# Patient Record
Sex: MALE | Race: WHITE | NOT HISPANIC OR LATINO | Employment: FULL TIME | URBAN - METROPOLITAN AREA
[De-identification: names, ages, dates, MRNs, and addresses within clinical notes are randomized per-mention and may not be internally consistent; named-entity substitution may affect disease eponyms.]

---

## 2023-04-24 ENCOUNTER — OFFICE VISIT (OUTPATIENT)
Age: 58
End: 2023-04-24

## 2023-04-24 DIAGNOSIS — S39.012D BACK STRAIN, SUBSEQUENT ENCOUNTER: Primary | ICD-10-CM

## 2023-04-24 DIAGNOSIS — M54.41 ACUTE RIGHT-SIDED LOW BACK PAIN WITH RIGHT-SIDED SCIATICA: ICD-10-CM

## 2023-04-24 NOTE — PROGRESS NOTES
"Daily Note     Today's date: 2023  Patient name: Jn Michelle  : 1965  MRN: [id number]  Referring provider: Shila Little DO  Dx:   Encounter Diagnosis     ICD-10-CM    1  Back strain, subsequent encounter  S39 012D       2  Acute right-sided low back pain with right-sided sciatica  M54 41           Start Time: 1525  Stop Time: 1610  Total time in clinic (min): 45 minutes    Subjective: Pt states current pain is 4/10, @ best 4/10, @ worst 5/10  Objective: See treatment diary below      Assessment:   Patient performed Nustep aerobic exercise to increase blood flow to the area being treated, prepare the muscles for strength training and stretching, improve overall tolerance to activity, and aerobic endurance  Initiated close chain LE strengthening and progressed with lumbar stabilization ex with standing tbands  Plan to continue pre-existing stretching program as HEP   Plan to progress wth spinal stabilization and LE strengthening program in order to increase activity level with minimal pain  Plan: Progress treatment as tolerated  Precautions: 1 year history of LBP      Manuals            Rot stretch/STM NV -                                                  Neuro Re-Ed             Instruction in HEP Reviewed previous HEP            Instruction in posture and body mechanics NV discussed hip hinging, maintaining LS spine in neutral while forward flexing at hips             DLS tbands  B shoulder/elbow F/E 20x all           Lateral press  20x/20x GTB L/R           Hip hinge  5x hold 5\"                                     Ther Ex             K to C  LTR  PPT 5x L/R  5x  5x HEP           S/L clamshells 20x L/R GTB 20x L/R GTB           Stand lumbar ext 3x 5x hold 5\"           Prone on elbows 1 min 3 min           Bird dogs 10x 10x           Seated LAQ with DF pumps/nerve tensions - 3x 10x reps L/R           Press ups 5x 5x                        Ther Activity             NUSTEP " 5' seat 10 L5           Stand HT raises  10x/10x           squats  10x           Hip 3 ways  10x L/R                        Modalities

## 2023-04-27 ENCOUNTER — OFFICE VISIT (OUTPATIENT)
Age: 58
End: 2023-04-27

## 2023-04-27 DIAGNOSIS — M54.41 ACUTE RIGHT-SIDED LOW BACK PAIN WITH RIGHT-SIDED SCIATICA: Primary | ICD-10-CM

## 2023-04-27 DIAGNOSIS — S39.012D BACK STRAIN, SUBSEQUENT ENCOUNTER: ICD-10-CM

## 2023-04-27 NOTE — PROGRESS NOTES
"Daily Note     Today's date: 2023  Patient name: Blaine Garrett  : 1965  MRN: [id number]  Referring provider: Delroy Lewis DO  Dx:   Encounter Diagnosis     ICD-10-CM    1  Acute right-sided low back pain with right-sided sciatica  M54 41       2  Back strain, subsequent encounter  S39 012D           Start Time: 1700  Stop Time: 1735  Total time in clinic (min): 35 minutes    Subjective: Pt states painful today having to use back at work today, 5/10  Objective: See treatment diary below      Assessment:  Patient performed Nustep aerobic exercise to increase blood flow to the area being treated, prepare the muscles for strength training and stretching, improve overall tolerance to activity, and aerobic endurance  Progressed with strengthening/lumbar stabilization ex, tolerating all with minimal pain  Continue PT with goal of increasing activity level with minimal pain  Plan: Progress treatment as tolerated  Precautions: 1 year history of LBP      Manuals            NV -                                                  Neuro Re-Ed             Instruction in HEP Reviewed previous HEP            Instruction in posture and body mechanics NV discussed hip hinging, maintaining LS spine in neutral while forward flexing at hips             DLS tbands  B shoulder/elbow F/E 20x all 20x GTB          Lateral press  20x/20x GTB L/R 20x/20x GTB          Hip hinge  5x hold 5\" 5x 5\"          Bridge with abd bracing   10x          DLS lift knee unsupproted   5x L/R          Ther Ex             K to C  LTR  PPT 5x L/R  5x  5x HEP 5x  5x  5x          S/L clamshells 20x L/R GTB 20x L/R GTB 20x L/R          Stand lumbar ext 3x 5x hold 5\" 5x          Prone on elbows 1 min 3 min 3 min          Bird dogs 10x 10x 10x          Seated LAQ with DF pumps/nerve tensions - 3x 10x reps L/R 3 x 10 reps L/R          Press ups 5x 5x 10x                       Ther Activity             NUSTEP  5' seat 10 L5 " 5' L5          Stand HT raises  10x/10x 10x/10x          squats  10x 10x          Hip 3 ways  10x L/R 10x/10x                       Modalities

## 2023-05-01 ENCOUNTER — OFFICE VISIT (OUTPATIENT)
Age: 58
End: 2023-05-01

## 2023-05-01 DIAGNOSIS — S39.012D BACK STRAIN, SUBSEQUENT ENCOUNTER: ICD-10-CM

## 2023-05-01 DIAGNOSIS — M54.41 ACUTE RIGHT-SIDED LOW BACK PAIN WITH RIGHT-SIDED SCIATICA: Primary | ICD-10-CM

## 2023-05-01 NOTE — PROGRESS NOTES
"Daily Note     Today's date: 2023  Patient name: Shilo Medley  : 1965  MRN: [id number]  Referring provider: Marcy Marcano DO  Dx:   Encounter Diagnosis     ICD-10-CM    1  Acute right-sided low back pain with right-sided sciatica  M54 41       2  Back strain, subsequent encounter  S39 012D           Start Time: 911  Stop Time: 1635  Total time in clinic (min): 40 minutes    Subjective: Pt states he is tired after work today, pain 5/10 currently, @ best 4/15, @ worst 10  Objective: See treatment diary below      Assessment:   Patient performed Nustep aerobic exercise to increase blood flow to the area being treated, prepare the muscles for strength training and stretching, improve overall tolerance to activity, and aerobic endurance  Plan: Progress treatment as tolerated  Precautions: 1 year history of LBP      Manuals           NV -                                                  Neuro Re-Ed             Instruction in HEP Reviewed previous HEP            Instruction in posture and body mechanics NV discussed hip hinging, maintaining LS spine in neutral while forward flexing at hips                          DLS tbands  B shoulder/elbow F/E 20x all 20x GTB 20x GTB         Lateral press  20x/20x GTB L/R 20x/20x GTB 20x/20x GTB         Hip hinge  5x hold 5\" 5x 5\" 5x 5\"         Cat n camel    5x         Bridge with abd bracing   10x 20x GTB         DLS lift knee unsupproted   5x L/R 5x L/R         DLS lunge    5x L/R         Ther Ex             K to C  LTR  PPT 5x L/R  5x  5x HEP 5x  5x  5x 5x L/R  10x  10x         S/L clamshells 20x L/R GTB 20x L/R GTB 20x L/R 20x GTB         Stand lumbar ext 3x 5x hold 5\" 5x 5x         Prone on elbows 1 min 3 min 3 min 3 min         Bird dogs 10x 10x 10x 10x         Seated LAQ with DF pumps/nerve tensions - 3x 10x reps L/R 3 x 10 reps L/R 2 x 10 reps L/R         Press ups 5x 5x 10x 10x                      Ther Activity           " NUSTEP  5' seat 10 L5 5' L5 6' L5         Stand HT raises  10x/10x 10x/10x 10x/10x         squats  10x 10x 10x         Hip 3 ways  10x L/R 10x/10x 10x/10x                      Modalities

## 2023-05-03 ENCOUNTER — APPOINTMENT (OUTPATIENT)
Age: 58
End: 2023-05-03
Payer: COMMERCIAL

## 2023-05-03 NOTE — PROGRESS NOTES
"Daily Note     Today's date: 2023  Patient name: Sarita Nicolas  : 1965  MRN: [id number]  Referring provider: Uriel Ramírez DO  Dx:   Encounter Diagnosis     ICD-10-CM    1  Back strain, subsequent encounter  S39 012D       2  Acute right-sided low back pain with right-sided sciatica  M54 41           Start Time: 1615  Stop Time: 1655  Total time in clinic (min): 40 minutes    Subjective: Pain 4-5/10 today  States he is [place] a rough day  \"      Objective: See treatment diary below      Assessment:   Patient performed Nustep aerobic exercise to increase blood flow to the area being treated, prepare the muscles for strength training and stretching, improve overall tolerance to activity, and aerobic endurance  Progressing with LE strengthening, lumbar flexibility and dynamic lumbar stabilization program   Pt completes all activity without complaint  Continue PT with goal of increasing activity level without pain  Plan: Progress treatment as tolerated  Precautions: 1 year history of LBP      Manuals          NV -                                                  Neuro Re-Ed             Instruction in HEP Reviewed previous HEP            Instruction in posture and body mechanics NV discussed hip hinging, maintaining LS spine in neutral while forward flexing at hips                          DLS tbands  B shoulder/elbow F/E 20x all 20x GTB 20x GTB 20x GTB        Lateral press  20x/20x GTB L/R 20x/20x GTB 20x/20x GTB 20x/20x GTB        Hip hinge  5x hold 5\" 5x 5\" 5x 5\" 5x 5'        Cat n camel    5x 10x        Bridge with abd bracing   10x 20x GTB 20x GTB        DLS lift knee unsupproted   5x L/R 5x L/R 10x L/R        DLS lunge    5x L/R 5x L/R        Ther Ex             K to C  LTR  PPT 5x L/R  5x  5x HEP 5x  5x  5x 5x L/R  10x  10x 5x  10x  10x        S/L clamshells 20x L/R GTB 20x L/R GTB 20x L/R 20x GTB 20x GTB        Stand lumbar ext 3x 5x hold 5\" 5x 5x 5x        Prone " on elbows 1 min 3 min 3 min 3 min 3mn        Bird dogs 10x 10x 10x 10x 10x        Seated LAQ with DF pumps/nerve tensions - 3x 10x reps L/R 3 x 10 reps L/R 2 x 10 reps L/R 2 x 10        Press ups 5x 5x 10x 10x 10x                     Ther Activity             NUSTEP  5' seat 10 L5 5' L5 6' L5 7' L5        Stand HT raises  10x/10x 10x/10x 10x/10x 10x/10x        squats  10x 10x 10x 10x        Hip 3 ways  10x L/R 10x/10x 10x/10x 10x/10x L/R        Sit to stand     5x no hands        Modalities

## 2023-05-04 ENCOUNTER — OFFICE VISIT (OUTPATIENT)
Age: 58
End: 2023-05-04

## 2023-05-04 DIAGNOSIS — S39.012D BACK STRAIN, SUBSEQUENT ENCOUNTER: Primary | ICD-10-CM

## 2023-05-04 DIAGNOSIS — M54.41 ACUTE RIGHT-SIDED LOW BACK PAIN WITH RIGHT-SIDED SCIATICA: ICD-10-CM

## 2023-05-04 NOTE — PROGRESS NOTES
"Daily Note     Today's date: 2023  Patient name: Julien Richard  : 1965  MRN: [id number]  Referring provider: Kevin Alvarez DO  Dx:   Encounter Diagnosis     ICD-10-CM    1  Acute right-sided low back pain with right-sided sciatica  M54 41       2  Back strain, subsequent encounter  S39 012D           Start Time: 3795  Stop Time: 1640  Total time in clinic (min): 45 minutes    Subjective: Current pain 3/10, @ worst 4/10, @ best 3/10  Objective: See treatment diary below      Assessment:   Patient performed Nustep aerobic exercise to increase blood flow to the area being treated, prepare the muscles for strength training and stretching, improve overall tolerance to activity, and aerobic endurance  Progressing with LE strengthening and lumbar stabilization ex  Some difficulty with sit to stand without hands from low chair, was able to complete without R LS pain from raised chair  Continue PT with goal of increasing activity level with minimal pain  Plan: Progress treatment as tolerated  Precautions: 1 year history of LBP      Manuals         NV -                                                  Neuro Re-Ed             Instruction in HEP Reviewed previous HEP            Instruction in posture and body mechanics NV discussed hip hinging, maintaining LS spine in neutral while forward flexing at hips                          DLS tbands  B shoulder/elbow F/E 20x all 20x GTB 20x GTB 20x GTB 20x GTB       Lateral press  20x/20x GTB L/R 20x/20x GTB 20x/20x GTB 20x/20x GTB 20x/20x GTB       Hip hinge  5x hold 5\" 5x 5\" 5x 5\" 5x 5' 5x 10\"       Cat n camel    5x 10x 10x       Bridge with abd bracing   10x 20x GTB 20x GTB 20x GTB       DLS lift knee unsupproted   5x L/R 5x L/R 10x L/R 10x L/R       DLS lunge    5x L/R 5x L/R 5x L/R       Ther Ex             K to C  LTR  PPT 5x L/R  5x  5x HEP 5x  5x  5x 5x L/R  10x  10x 5x  10x  10x 5x  10x  10x       S/L clamshells 20x " "L/R GTB 20x L/R GTB 20x L/R 20x GTB 20x GTB 20x GTB       Stand lumbar ext 3x 5x hold 5\" 5x 5x 5x 5x       Prone on elbows 1 min 3 min 3 min 3 min 3mn 3 min       Bird dogs 10x 10x 10x 10x 10x 10x       Seated LAQ with DF pumps/nerve tensions - 3x 10x reps L/R 3 x 10 reps L/R 2 x 10 reps L/R 2 x 10 2 x 10 reps       Press ups 5x 5x 10x 10x 10x 10x                    Ther Activity             NUSTEP  5' seat 10 L5 5' L5 6' L5 7' L5 8'       Stand HT raises  10x/10x 10x/10x 10x/10x 10x/10x 10x/10x       squats  10x 10x 10x 10x 10x       Hip 3 ways  10x L/R 10x/10x 10x/10x 10x/10x L/R 10x L/R       Sit to stand     5x no hands 5x form lowered plinthe 24\"       Modalities                                            "

## 2023-05-08 ENCOUNTER — OFFICE VISIT (OUTPATIENT)
Age: 58
End: 2023-05-08

## 2023-05-08 DIAGNOSIS — S39.012D BACK STRAIN, SUBSEQUENT ENCOUNTER: ICD-10-CM

## 2023-05-08 DIAGNOSIS — M54.41 ACUTE RIGHT-SIDED LOW BACK PAIN WITH RIGHT-SIDED SCIATICA: Primary | ICD-10-CM

## 2023-05-10 NOTE — PROGRESS NOTES
"Daily Note     Today's date: 2023  Patient name: Kayla Garcia  : 1965  MRN: [id number]  Referring provider: Tsering Pierce DO  Dx:   Encounter Diagnosis     ICD-10-CM    1  Back strain, subsequent encounter  S39 012D       2  Acute right-sided low back pain with right-sided sciatica  M54 41           Start Time: 1625  Stop Time: 1705  Total time in clinic (min): 40 minutes    Subjective: More painful today, 5/10  Objective: See treatment diary below      Assessment:   Patient performed Nustep aerobic exercise to increase blood flow to the area being treated, prepare the muscles for strength training and stretching, improve overall tolerance to activity, and aerobic endurance  Pt reporting R LS pain with DLS lunge on R today  Limited excursion of exercise  Continue with lumbar flexibility, B LE strengthening and spinal stabilization program   t is challenged with current ex program   Continue PT with goal of increasing activity level with minimal pin  Plan: Progress treatment as tolerated  Precautions: 1 year history of LBP      Manuals        NV -                                                  Neuro Re-Ed             Instruction in HEP Reviewed previous HEP            Instruction in posture and body mechanics NV discussed hip hinging, maintaining LS spine in neutral while forward flexing at hips                          DLS tbands  B shoulder/elbow F/E 20x all 20x GTB 20x GTB 20x GTB 20x GTB 20x GTB      Lateral press  20x/20x GTB L/R 20x/20x GTB 20x/20x GTB 20x/20x GTB 20x/20x GTB 20x/20x GTB      Hip hinge  5x hold 5\" 5x 5\" 5x 5\" 5x 5' 5x 10\" 5 x 10\"      Cat n camel    5x 10x 10x 10x      Bridge with abd bracing   10x 20x GTB 20x GTB 20x GTB 20x       DLS lift knee unsupproted   5x L/R 5x L/R 10x L/R 10x L/R -      DLS lunge    5x L/R 5x L/R 5x L/R 5x L/R      Ther Ex             K to C  LTR  PPT 5x L/R  5x  5x HEP 5x  5x  5x 5x L/R  10x  10x " "5x  10x  10x 5x  10x  10x 5x  10x  10x      S/L clamshells 20x L/R GTB 20x L/R GTB 20x L/R 20x GTB 20x GTB 20x GTB 20x GTB      Stand lumbar ext 3x 5x hold 5\" 5x 5x 5x 5x 5x      Prone on elbows 1 min 3 min 3 min 3 min 3mn 3 min 3 min      Bird dogs 10x 10x 10x 10x 10x 10x 10x      Seated LAQ with DF pumps/nerve tensions - 3x 10x reps L/R 3 x 10 reps L/R 2 x 10 reps L/R 2 x 10 2 x 10 reps 2 x 10 reps DF      Press ups 5x 5x 10x 10x 10x 10x 10x                   Ther Activity             NUSTEP  5' seat 10 L5 5' L5 6' L5 7' L5 8' 8'      Stand HT raises  10x/10x 10x/10x 10x/10x 10x/10x 10x/10x 10/10x      squats  10x 10x 10x 10x 10x 10x      Hip 3 ways  10x L/R 10x/10x 10x/10x 10x/10x L/R 10x L/R 10x/10x      Sit to stand     5x no hands 5x form lowered plinthe 24\" 10x from 24\" plinthe      Modalities                                            "

## 2023-05-11 ENCOUNTER — OFFICE VISIT (OUTPATIENT)
Age: 58
End: 2023-05-11

## 2023-05-11 DIAGNOSIS — M54.41 ACUTE RIGHT-SIDED LOW BACK PAIN WITH RIGHT-SIDED SCIATICA: ICD-10-CM

## 2023-05-11 DIAGNOSIS — S39.012D BACK STRAIN, SUBSEQUENT ENCOUNTER: Primary | ICD-10-CM

## 2023-05-15 ENCOUNTER — OFFICE VISIT (OUTPATIENT)
Age: 58
End: 2023-05-15

## 2023-05-15 DIAGNOSIS — S39.012D BACK STRAIN, SUBSEQUENT ENCOUNTER: ICD-10-CM

## 2023-05-15 DIAGNOSIS — M54.41 ACUTE RIGHT-SIDED LOW BACK PAIN WITH RIGHT-SIDED SCIATICA: Primary | ICD-10-CM

## 2023-05-15 NOTE — LETTER
May 16, 2023    Ashok Carter DO  [address]   [address]  [address] [facility]    Patient: Gracie Kovacs   YOB: 1965   Date of Visit: 5/15/2023     Encounter Diagnosis     ICD-10-CM    1  Acute right-sided low back pain with right-sided sciatica  M54 41       2  Back strain, subsequent encounter  S39 012D           Dear Dr Zan Hearn: Thank you for your recent referral of Gracie Kovacs  Please review the attached evaluation summary from Amauri's recent visit  Please verify that you agree with the plan of care by signing the attached order  If you have any questions or concerns, please do not hesitate to call  I sincerely appreciate the opportunity to share in the care of one of your patients and hope to have another opportunity to work with you in the near future  Sincerely,    Carlos Mason, PT      Referring Provider:      I certify that I have read the below Plan of Care and certify the need for these services furnished under this plan of treatment while under my care  Ashok Carter   [address]   [address]  [address] [facility]  Via Fax: [phone]          PT Re-Evaluation     Today's date: 5/15/2023  Patient name: Gracie Kovacs  : 1965  MRN: [id number]  Referring provider: Katharine Martinez DO  Dx:   Encounter Diagnosis     ICD-10-CM    1  Acute right-sided low back pain with right-sided sciatica  M54 41       2  Back strain, subsequent encounter  S39 012D           Start Time: 1600  Stop Time: 5474  Total time in clinic (min): 50 minutes    Assessment  Assessment details: The patient is a 59year old man 1 year post onset of this bout of LBP  Pain range 3-5/10  Pt has radiating pain R LE to posterior knee  Pt noting minimal improvement  Modest improvement with ROM and strength noted, however Oswestry improved from 32% disability to 16% disability    Pt working full time without restriction, but states pain increases by the end of the day, notes he is slow and stiff in the morning  Pain with ascending and descending steps, goes 1 at a time, non reciproacally  Pain R LS/hip persists  Plan to continue PT 4-6 weeks with goal of increasing activity level with minimal pain  Impairments: abnormal or restricted ROM, activity intolerance, impaired physical strength, lacks appropriate home exercise program, pain with function and poor body mechanics  Functional limitations: standing all day with pain, walking 15 min, 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; steps non reciprocally  Symptom irritability: moderateUnderstanding of Dx/Px/POC: fair   Prognosis: fair  Prognosis details: 1 year post onset of pain, has had previous course of PT    Goals  STG- 4 weeks 5/15/23  1  I HEP (MET)  2  Instruct patient in proper posture and body mechanics(MET)  3  Improve patients self awareness of posture and body mechanics (progressing)  4  Centralize R LE symptoms (NOT MET)  5  Decrease pain range to 0-5/10 (NOT MET)    STG- 4 weeks 6/12/23-   1  Centralize R LE symptoms  2  Decrease pain range to 2-4/10  3  Up/down steps reciprocally  4  Walk 20-30 minutes consecutively        LTG- 12 weeks 7/10/23  1  Decrease pain range to 0-2  2  Resolve radiating radicular symptoms  3  Improve B hip strength to 4+-5/5  4  Improve trunk ROM to WNL  5   Improve Oswestry score 15-20 points from IE    Plan  Patient would benefit from: PT eval and skilled physical therapy  Referral necessary: No  Planned modality interventions: cryotherapy and thermotherapy: hydrocollator packs  Planned therapy interventions: manual therapy, joint mobilization, neuromuscular re-education, postural training, patient education, therapeutic activities, therapeutic exercise and home exercise program  Frequency: 2x week  Duration in visits: 8  Duration in weeks: 16  Plan of Care beginning date: 5/15/2023  Plan of Care expiration date: 7/10/2023  Treatment plan discussed with: patient        Subjective Evaluation    History of Present Illness  Date of onset: 3/22/2023  Mechanism of injury: 5/15/23- Pt states he is less painful  Able to do small amounts of gardening  Pt able to walk 15 min  States radiating LE pain is less intense  Pain with up and down steps  Pt states he received a chiropractic treatment yesterday  Pt was at work 1 year ago and was lifting a box and twisted and felt a pop  Pt works at [employer]  Pt seen in [place] [id number]  Pt received lidocaine patch and muscle relaxant at that time  Pt received PT for 3 weeks with some pain relief  Pt currently working without restriction  Pt has had another round of PT for 6 months, and was DC from that facility  Pt received epidural injection 2 weeks ago and had 3 days of pain relief  Pt currently in litigation against [employer] for a [employer] claim  Pt referred for outpatient PT  Imaging revealed Lumbar disc bulge L 1-2, L 2-3, and HNPL 3-4, L5-S1, R S1 radiculopathy per EMG            Recurrent probem    Quality of life: fair    Pain  Current pain ratin  At best pain rating: 3  At worst pain ratin  Location: pain radiating from R LS, and radiating to posterior R knee  Quality: burning, throbbing and sharp  Relieving factors: ice and rest  Progression: no change    Social Support  Steps to enter house: yes  3  Stairs in house: yes   13  Lives in: multiple-level home  Lives with: spouse    Employment status: working  Hand dominance: right      Diagnostic Tests  X-ray: abnormal  MRI studies: abnormal  Treatments  Previous treatment: physical therapy  Current treatment: physical therapy  Patient Goals  Patient goals for therapy: decreased pain  Patient goal: decreased pain        Objective     Concurrent Complaints  Negative for night pain, disturbed sleep, bladder dysfunction, bowel dysfunction, saddle (S4) numbness, cardiac problem, kidney problem, gallbladder problem, stomach problem, ulcer, appendix "problem, spleen problem, pancreas problem, history of cancer, history of trauma and infection    Postural Observations  Seated posture: fair  Standing posture: fair    Additional Postural Observation Details  Decreased lumbar lordosis, forward head and shoulders    Tenderness     Lumbar Spine  Tenderness in the spinous process  Right Hip   Tenderness in the PSIS  Additional Tenderness Details  R PSIS and lower lumbar paraspinals very painful    Neurological Testing     Sensation     Lumbar   Left   Intact: light touch    Right   Intact: light touch  Paresthesia: light touch    Additional Neurological Details  Radiating pain from R buttock to posterior R knee    Active Range of Motion     Lumbar   Flexion:  Restriction level: moderate  Extension:  Restriction level: minimal  Left lateral flexion:  Restriction level: minimal  Right lateral flexion:  Restriction level: minimal  Left rotation:  WFL  Right rotation:  Restriction level: minimal    Additional Active Range of Motion Details  Fingertip to floor: For Flx/40cm, SB L/57cm, SB/R/54cm    5/15/23- fingertip to floor: For Flx/ 38cm   SB L/54cm, SB R/53 5cm,   Mechanical Assessment    Cervical      Thoracic    Lying extension:   Pain location: centralized  Pain intensity: better  Pain level: decreased    Lumbar    Standing extension: repeated movements  Pain location: no change    Strength/Myotome Testing     Additional Strength Details  Trunk flx 3/5, ext 4/5  5/15- trunk flx 4/5, ext 4/5    Tests     Lumbar     Left   Negative passive SLR  Right   Negative passive SLR       Additional Tests Details  Pain decreased with B zahra traction    4/17/23- TUG/18\", 5x STS/35\"    5/15/23- TUG/15\", 5x STS/33\"    General Comments:      Lumbar Comments  4/17/23- Oswestry 32%    5/15/23- Modified Oswestry 16%    Hip Comments   R hip IR ROM moderately limited    Knee Comments  B squat 50%, unable to rise to stand without use of hands from 18\" cahir, no hands from 24\" " "chair    Ankle/Foot Comments   HT walk intact            Precautions: 1 year history of LBP      Manuals 4/17 4/24 4/27 5/1 5/4 5/8 5/11 5/15      NV -                                                  Neuro Re-Ed        RE/Oswestry     Instruction in HEP Reviewed previous HEP            Instruction in posture and body mechanics NV discussed hip hinging, maintaining LS spine in neutral while forward flexing at hips                          DLS tbands  B shoulder/elbow F/E 20x all 20x GTB 20x GTB 20x GTB 20x GTB 20x GTB 20x GTB     Lateral press  20x/20x GTB L/R 20x/20x GTB 20x/20x GTB 20x/20x GTB 20x/20x GTB 20x/20x GTB 20x20x GTB     Hip hinge  5x hold 5\" 5x 5\" 5x 5\" 5x 5' 5x 10\" 5 x 10\" 5x 10\"     Cat n camel    5x 10x 10x 10x      Bridge with abd bracing   10x 20x GTB 20x GTB 20x GTB 20x  20x     DLS lift knee unsupproted   5x L/R 5x L/R 10x L/R 10x L/R - 10x L/R     DLS lunge    5x L/R 5x L/R 5x L/R 5x L/R -     Ther Ex             K to C  LTR  PPT 5x L/R  5x  5x HEP 5x  5x  5x 5x L/R  10x  10x 5x  10x  10x 5x  10x  10x 5x  10x  10x 5x  10x  10x     S/L clamshells 20x L/R GTB 20x L/R GTB 20x L/R 20x GTB 20x GTB 20x GTB 20x GTB 20x GTB     Stand lumbar ext 3x 5x hold 5\" 5x 5x 5x 5x 5x 5x     Prone on elbows 1 min 3 min 3 min 3 min 3mn 3 min 3 min 3 min     Bird dogs 10x 10x 10x 10x 10x 10x 10x 10x     Seated LAQ with DF pumps/nerve tensions - 3x 10x reps L/R 3 x 10 reps L/R 2 x 10 reps L/R 2 x 10 2 x 10 reps 2 x 10 reps DF 2 x  Reps DF     Press ups 5x 5x 10x 10x 10x 10x 10x 10x                  Ther Activity             NUSTEP  5' seat 10 L5 5' L5 6' L5 7' L5 8' 8' 8' L5     Stand HT raises  10x/10x 10x/10x 10x/10x 10x/10x 10x/10x 10/10x 10x/10x     squats  10x 10x 10x 10x 10x 10x 10x     Step up/side             Hip 3 ways  10x L/R 10x/10x 10x/10x 10x/10x L/R 10x L/R 10x/10x 10x/10x     Sit to stand     5x no hands 5x form lowered plinthe 24\" 10x from 24\" plinthe 10x from 24\" plinthe     Modalities                "

## 2023-05-15 NOTE — PROGRESS NOTES
"Daily Note     Today's date: 2023  Patient name: Charito Leon  : 1965  MRN: [id number]  Referring provider: Casey Everett DO  Dx:   Encounter Diagnosis     ICD-10-CM    1  Acute right-sided low back pain with right-sided sciatica  M54 41       2  Back strain, subsequent encounter  S39 012D                      Subjective: ***      Objective: See treatment diary below      Assessment:   Patient performed {KUAerobic:24809} aerobic exercise to increase blood flow to the area being treated, prepare the muscles for strength training and stretching, improve overall tolerance to activity, and aerobic endurance  Plan: Progress treatment as tolerated  Precautions: 1 year history of LBP      Manuals 4/17 4/24 4/27 5/1 5/4 5/8 5/11 5/15      NV -                                                  Neuro Re-Ed             Instruction in HEP Reviewed previous HEP            Instruction in posture and body mechanics NV discussed hip hinging, maintaining LS spine in neutral while forward flexing at hips                          DLS tbands  B shoulder/elbow F/E 20x all 20x GTB 20x GTB 20x GTB 20x GTB 20x GTB      Lateral press  20x/20x GTB L/R 20x/20x GTB 20x/20x GTB 20x/20x GTB 20x/20x GTB 20x/20x GTB      Hip hinge  5x hold 5\" 5x 5\" 5x 5\" 5x 5' 5x 10\" 5 x 10\"      Cat n camel    5x 10x 10x 10x      Bridge with abd bracing   10x 20x GTB 20x GTB 20x GTB 20x       DLS lift knee unsupproted   5x L/R 5x L/R 10x L/R 10x L/R -      DLS lunge    5x L/R 5x L/R 5x L/R 5x L/R      Ther Ex             K to C  LTR  PPT 5x L/R  5x  5x HEP 5x  5x  5x 5x L/R  10x  10x 5x  10x  10x 5x  10x  10x 5x  10x  10x      S/L clamshells 20x L/R GTB 20x L/R GTB 20x L/R 20x GTB 20x GTB 20x GTB 20x GTB      Stand lumbar ext 3x 5x hold 5\" 5x 5x 5x 5x 5x      Prone on elbows 1 min 3 min 3 min 3 min 3mn 3 min 3 min      Bird dogs 10x 10x 10x 10x 10x 10x 10x      Seated LAQ with DF pumps/nerve tensions - 3x 10x reps L/R 3 x 10 reps L/R 2 x " "10 reps L/R 2 x 10 2 x 10 reps 2 x 10 reps DF      Press ups 5x 5x 10x 10x 10x 10x 10x                   Ther Activity             NUSTEP  5' seat 10 L5 5' L5 6' L5 7' L5 8' 8'      Stand HT raises  10x/10x 10x/10x 10x/10x 10x/10x 10x/10x 10/10x      squats  10x 10x 10x 10x 10x 10x      Hip 3 ways  10x L/R 10x/10x 10x/10x 10x/10x L/R 10x L/R 10x/10x      Sit to stand     5x no hands 5x form lowered plinthe 24\" 10x from 24\" plinthe      Modalities                                            "

## 2023-05-15 NOTE — PROGRESS NOTES
PT Re-Evaluation     Today's date: 5/15/2023  Patient name: Renee Devries  : 1965  MRN: [id number]  Referring provider: Joan Lacey DO  Dx:   Encounter Diagnosis     ICD-10-CM    1  Acute right-sided low back pain with right-sided sciatica  M54 41       2  Back strain, subsequent encounter  S39 012D           Start Time: 1600  Stop Time: 3514  Total time in clinic (min): 50 minutes    Assessment  Assessment details: The patient is a 59year old man 1 year post onset of this bout of LBP  Pain range 3-5/10  Pt has radiating pain R LE to posterior knee  Pt noting minimal improvement  Modest improvement with ROM and strength noted, however Oswestry improved from 32% disability to 16% disability  Pt working full time without restriction, but states pain increases by the end of the day, notes he is slow and stiff in the morning  Pain with ascending and descending steps, goes 1 at a time, non reciproacally  Pain R LS/hip persists  Plan to continue PT 4-6 weeks with goal of increasing activity level with minimal pain  Impairments: abnormal or restricted ROM, activity intolerance, impaired physical strength, lacks appropriate home exercise program, pain with function and poor body mechanics  Functional limitations: standing all day with pain, walking 15 min, 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; steps non reciprocally  Symptom irritability: moderateUnderstanding of Dx/Px/POC: fair   Prognosis: fair  Prognosis details: 1 year post onset of pain, has had previous course of PT    Goals  STG- 4 weeks 5/15/23  1  I HEP (MET)  2  Instruct patient in proper posture and body mechanics(MET)  3  Improve patients self awareness of posture and body mechanics (progressing)  4  Centralize R LE symptoms (NOT MET)  5  Decrease pain range to 0-5/10 (NOT MET)    STG- 4 weeks 23-   1  Centralize R LE symptoms  2  Decrease pain range to 2-4/10  3  Up/down steps reciprocally  4   Walk 20-30 minutes consecutively        LTG- 12 weeks 7/10/23  1  Decrease pain range to 0-2  2  Resolve radiating radicular symptoms  3  Improve B hip strength to 4+-5/5  4  Improve trunk ROM to WNL  5  Improve Oswestry score 15-20 points from IE    Plan  Patient would benefit from: PT eval and skilled physical therapy  Referral necessary: No  Planned modality interventions: cryotherapy and thermotherapy: hydrocollator packs  Planned therapy interventions: manual therapy, joint mobilization, neuromuscular re-education, postural training, patient education, therapeutic activities, therapeutic exercise and home exercise program  Frequency: 2x week  Duration in visits: 8  Duration in weeks: 16  Plan of Care beginning date: 5/15/2023  Plan of Care expiration date: 7/10/2023  Treatment plan discussed with: patient        Subjective Evaluation    History of Present Illness  Date of onset: 3/22/2023  Mechanism of injury: 5/15/23- Pt states he is less painful  Able to do small amounts of gardening  Pt able to walk 15 min  States radiating LE pain is less intense  Pain with up and down steps  Pt states he received a chiropractic treatment yesterday  Pt was at work 1 year ago and was lifting a box and twisted and felt a pop  Pt works at [employer]  Pt seen in [place] [address]  Pt received lidocaine patch and muscle relaxant at that time  Pt received PT for 3 weeks with some pain relief  Pt currently working without restriction  Pt has had another round of PT for 6 months, and was DC from that facility  Pt received epidural injection 2 weeks ago and had 3 days of pain relief  Pt currently in litigation against [employer] for a [employer] claim  Pt referred for outpatient PT  Imaging revealed Lumbar disc bulge L 1-2, L 2-3, and HNPL 3-4, L5-S1, R S1 radiculopathy per EMG            Recurrent probem    Quality of life: fair    Pain  Current pain ratin  At best pain rating: 3  At worst pain ratin  Location: pain radiating from R LS, and radiating to posterior R knee  Quality: burning, throbbing and sharp  Relieving factors: ice and rest  Progression: no change    Social Support  Steps to enter house: yes  3  Stairs in house: yes   13  Lives in: multiple-level home  Lives with: spouse    Employment status: working  Hand dominance: right      Diagnostic Tests  X-ray: abnormal  MRI studies: abnormal  Treatments  Previous treatment: physical therapy  Current treatment: physical therapy  Patient Goals  Patient goals for therapy: decreased pain  Patient goal: decreased pain        Objective     Concurrent Complaints  Negative for night pain, disturbed sleep, bladder dysfunction, bowel dysfunction, saddle (S4) numbness, cardiac problem, kidney problem, gallbladder problem, stomach problem, ulcer, appendix problem, spleen problem, pancreas problem, history of cancer, history of trauma and infection    Postural Observations  Seated posture: fair  Standing posture: fair    Additional Postural Observation Details  Decreased lumbar lordosis, forward head and shoulders    Tenderness     Lumbar Spine  Tenderness in the spinous process  Right Hip   Tenderness in the PSIS  Additional Tenderness Details  R PSIS and lower lumbar paraspinals very painful    Neurological Testing     Sensation     Lumbar   Left   Intact: light touch    Right   Intact: light touch  Paresthesia: light touch    Additional Neurological Details  Radiating pain from R buttock to posterior R knee    Active Range of Motion     Lumbar   Flexion:  Restriction level: moderate  Extension:  Restriction level: minimal  Left lateral flexion:  Restriction level: minimal  Right lateral flexion:  Restriction level: minimal  Left rotation:  WFL  Right rotation:  Restriction level: minimal    Additional Active Range of Motion Details  Fingertip to floor: For Flx/40cm, SB L/57cm, SB/R/54cm    5/15/23- fingertip to floor:  For Flx/ 38cm   SB L/54cm, SB R/53 5cm,   Mechanical "Assessment    Cervical      Thoracic    Lying extension:   Pain location: centralized  Pain intensity: better  Pain level: decreased    Lumbar    Standing extension: repeated movements  Pain location: no change    Strength/Myotome Testing     Additional Strength Details  Trunk flx 3/5, ext 4/5  5/15- trunk flx 4/5, ext 4/5    Tests     Lumbar     Left   Negative passive SLR  Right   Negative passive SLR  Additional Tests Details  Pain decreased with B zahra traction    4/17/23- TUG/18\", 5x STS/35\"    5/15/23- TUG/15\", 5x STS/33\"    General Comments:      Lumbar Comments  4/17/23- Oswestry 32%    5/15/23- Modified Oswestry 16%    Hip Comments   R hip IR ROM moderately limited    Knee Comments  B squat 50%, unable to rise to stand without use of hands from 18\" cahir, no hands from 24\" chair    Ankle/Foot Comments   HT walk intact             Precautions: 1 year history of LBP      Manuals 4/17 4/24 4/27 5/1 5/4 5/8 5/11 5/15      NV -                                                  Neuro Re-Ed        RE/Oswestry     Instruction in HEP Reviewed previous HEP            Instruction in posture and body mechanics NV discussed hip hinging, maintaining LS spine in neutral while forward flexing at hips                          DLS tbands  B shoulder/elbow F/E 20x all 20x GTB 20x GTB 20x GTB 20x GTB 20x GTB 20x GTB     Lateral press  20x/20x GTB L/R 20x/20x GTB 20x/20x GTB 20x/20x GTB 20x/20x GTB 20x/20x GTB 20x20x GTB     Hip hinge  5x hold 5\" 5x 5\" 5x 5\" 5x 5' 5x 10\" 5 x 10\" 5x 10\"     Cat n camel    5x 10x 10x 10x      Bridge with abd bracing   10x 20x GTB 20x GTB 20x GTB 20x  20x     DLS lift knee unsupproted   5x L/R 5x L/R 10x L/R 10x L/R - 10x L/R     DLS lunge    5x L/R 5x L/R 5x L/R 5x L/R -     Ther Ex             K to C  LTR  PPT 5x L/R  5x  5x HEP 5x  5x  5x 5x L/R  10x  10x 5x  10x  10x 5x  10x  10x 5x  10x  10x 5x  10x  10x     S/L clamshells 20x L/R GTB 20x L/R GTB 20x L/R 20x GTB 20x GTB 20x GTB 20x " "GTB 20x GTB     Stand lumbar ext 3x 5x hold 5\" 5x 5x 5x 5x 5x 5x     Prone on elbows 1 min 3 min 3 min 3 min 3mn 3 min 3 min 3 min     Bird dogs 10x 10x 10x 10x 10x 10x 10x 10x     Seated LAQ with DF pumps/nerve tensions - 3x 10x reps L/R 3 x 10 reps L/R 2 x 10 reps L/R 2 x 10 2 x 10 reps 2 x 10 reps DF 2 x  Reps DF     Press ups 5x 5x 10x 10x 10x 10x 10x 10x                  Ther Activity             NUSTEP  5' seat 10 L5 5' L5 6' L5 7' L5 8' 8' 8' L5     Stand HT raises  10x/10x 10x/10x 10x/10x 10x/10x 10x/10x 10/10x 10x/10x     squats  10x 10x 10x 10x 10x 10x 10x     Step up/side             Hip 3 ways  10x L/R 10x/10x 10x/10x 10x/10x L/R 10x L/R 10x/10x 10x/10x     Sit to stand     5x no hands 5x form lowered plinthe 24\" 10x from 24\" plinthe 10x from 24\" plinthe     Modalities                                            "

## 2023-05-17 NOTE — PROGRESS NOTES
"Daily Note     Today's date: 2023  Patient name: Benny Wiley  : 1965  MRN: [id number]  Referring provider: Martinez Leary DO  Dx:   Encounter Diagnosis     ICD-10-CM    1  Back strain, subsequent encounter  S39 012D       2  Acute right-sided low back pain with right-sided sciatica  M54 41           Start Time: 1600  Stop Time: 1645  Total time in clinic (min): 45 minutes    Subjective: Pt states pain 5/0 today, \"rough day at work  \"    Objective: See treatment diary below      Assessment:   Patient performed Nustep aerobic exercise to increase blood flow to the area being treated, prepare the muscles for strength training and stretching, improve overall tolerance to activity, and aerobic endurance  Progressing with lumbar flexibility ex, spinal stabilization, B LE strengthening, lumbar flexibility ex, and endurance activity  Continue PT with goal of increasing activity level with less pain  Plan: Progress treatment as tolerated  Precautions: 1 year history of LBP      Manuals 4/17 4/24 4/27 5/1 5/4 5/8 5/11 5/15 5/18     NV -                                                  Neuro Re-Ed        RE/Oswestry #9    Instruction in HEP Reviewed previous HEP            Instruction in posture and body mechanics NV discussed hip hinging, maintaining LS spine in neutral while forward flexing at hips                          DLS tbands  B shoulder/elbow F/E 20x all 20x GTB 20x GTB 20x GTB 20x GTB 20x GTB 20x GTB 20x blue TB    Lateral press  20x/20x GTB L/R 20x/20x GTB 20x/20x GTB 20x/20x GTB 20x/20x GTB 20x/20x GTB 20x20x GTB 20x/20x blue TB    Hip hinge  5x hold 5\" 5x 5\" 5x 5\" 5x 5' 5x 10\" 5 x 10\" 5x 10\" 5 x 10\"    Cat n camel    5x 10x 10x 10x  10x    Bridge with abd bracing   10x 20x GTB 20x GTB 20x GTB 20x  20x 20x GTB    DLS lift knee unsupproted   5x L/R 5x L/R 10x L/R 10x L/R - 10x L/R 15x L/R    DLS lunge    5x L/R 5x L/R 5x L/R 5x L/R - 5x L/R    Ther Ex             K to C  LTR  PPT 5x " "L/R  5x  5x HEP 5x  5x  5x 5x L/R  10x  10x 5x  10x  10x 5x  10x  10x 5x  10x  10x 5x  10x  10x 5x  10  10x    S/L clamshells 20x L/R GTB 20x L/R GTB 20x L/R 20x GTB 20x GTB 20x GTB 20x GTB 20x GTB 20x GTB    Stand lumbar ext 3x 5x hold 5\" 5x 5x 5x 5x 5x 5x 5x    Prone on elbows 1 min 3 min 3 min 3 min 3mn 3 min 3 min 3 min 3 min    Bird dogs 10x 10x 10x 10x 10x 10x 10x 10x 15x    Seated LAQ with DF pumps/nerve tensions - 3x 10x reps L/R 3 x 10 reps L/R 2 x 10 reps L/R 2 x 10 2 x 10 reps 2 x 10 reps DF 2 x  Reps DF 2 x 10 reps DF L/R    Press ups 5x 5x 10x 10x 10x 10x 10x 10x 10x                 Ther Activity             NUSTEP  5' seat 10 L5 5' L5 6' L5 7' L5 8' 8' 8' L5 10' L5    walk         2' level with abd bracing    Stand HT raises  10x/10x 10x/10x 10x/10x 10x/10x 10x/10x 10/10x 10x/10x 10x/10x    squats  10x 10x 10x 10x 10x 10x 10x 10x    Step up/side             Hip 3 ways  10x L/R 10x/10x 10x/10x 10x/10x L/R 10x L/R 10x/10x 10x/10x 10x/10x    Sit to stand     5x no hands 5x form lowered plinthe 24\" 10x from 24\" plinthe 10x from 24\" plinthe 10x 24\"    Modalities                                                 "

## 2023-05-18 ENCOUNTER — OFFICE VISIT (OUTPATIENT)
Age: 58
End: 2023-05-18

## 2023-05-18 DIAGNOSIS — M54.41 ACUTE RIGHT-SIDED LOW BACK PAIN WITH RIGHT-SIDED SCIATICA: ICD-10-CM

## 2023-05-18 DIAGNOSIS — S39.012D BACK STRAIN, SUBSEQUENT ENCOUNTER: Primary | ICD-10-CM

## 2023-05-18 NOTE — PROGRESS NOTES
"Daily Note     Today's date: 2023  Patient name: Gene Guadarrama  : 1965  MRN: [id number]  Referring provider: Vincent Rollins DO  Dx:   Encounter Diagnosis     ICD-10-CM    1  Back strain, subsequent encounter  S39 012D       2  Acute right-sided low back pain with right-sided sciatica  M54 41           Start Time: 1555  Stop Time: 1640  Total time in clinic (min): 45 minutes    Subjective: Pt states he \"overdid\" it over the weekend, 4/10 today  Objective: See treatment diary below      Assessment: Pt states he walked 45 minutes of consecutive walking over the weekend    Patient performed Nustep aerobic exercise to increase blood flow to the area being treated, prepare the muscles for strength training and stretching, improve overall tolerance to activity, and aerobic endurance  Progressing with lumbar flexibility exercise, lumbar stabilization ex, endurance activity  Continue PT with goal of resuming all activity with minimal pain  Plan: Progress treatment as tolerated         Precautions: 1 year history of LBP      Manuals       5/11 5/15 5/18 5/22                                                       Neuro Re-Ed #11       RE/Oswestry #9 #10   Instruction in HEP             Instruction in posture and body mechanics                          DLS tbands       48H GTB 20x GTB 20x blue TB 20x blue TB   Lateral press       20x/20x GTB 20x20x GTB 20x/20x blue TB 20x/20x blue TB   Hip hinge       5 x 10\" 5x 10\" 5 x 10\" 5x 10\"   Cat n camel       10x  10x 10x   Quadruped reciprocal arm/leg lift          5x L/R   Bridge with abd bracing       20x  20x 20x GTB 20x blue TB   DLS lift knee unsupproted       - 10x L/R 15x L/R 15x L/R   DLS lunge       5x L/R - 5x L/R 5x L/R   Ther Ex             K to C  LTR  PPT         5x  10x  10x 5x  10x  10x 5x  10  10x 5x  10x  10x   S/L clamshells       20x GTB 20x GTB 20x GTB 20x blue TB   Stand lumbar ext       5x 5x 5x 5x   Prone on elbows       3 min 3 min 3 " "min 3 min   Bird dogs       10x 10x 15x 20x   Seated LAQ with DF pumps/nerve tensions       2 x 10 reps DF 2 x  Reps DF 2 x 10 reps DF L/R -   Press ups       10x 10x 10x 10x                Ther Activity             NUSTEP       8' 8' L5 10' L5 10' L5   walk         2' level with abd bracing 2' with abdomenal bracing   Stand HT raises       10/10x 10x/10x 10x/10x 10x/10x   squats       10x 10x 10x 10x   Step up/side             Hip 3 ways       10x/10x 10x/10x 10x/10x 10x L/R   Sit to stand       10x from 24\" plinthe 10x from 24\" plinthe 10x 24\" 10x 23\"   Modalities                                                   "

## 2023-05-22 ENCOUNTER — OFFICE VISIT (OUTPATIENT)
Age: 58
End: 2023-05-22

## 2023-05-22 DIAGNOSIS — M54.41 ACUTE RIGHT-SIDED LOW BACK PAIN WITH RIGHT-SIDED SCIATICA: ICD-10-CM

## 2023-05-22 DIAGNOSIS — S39.012D BACK STRAIN, SUBSEQUENT ENCOUNTER: Primary | ICD-10-CM

## 2023-05-24 NOTE — PROGRESS NOTES
"Daily Note     Today's date: 2023  Patient name: Man Ramos  : 1965  MRN: [id number]  Referring provider: Colin Salazar DO  Dx:   Encounter Diagnosis     ICD-10-CM    1  Acute right-sided low back pain with right-sided sciatica  M54 41       2  Back strain, subsequent encounter  S39 012D           Start Time: 1550  Stop Time: 1640  Total time in clinic (min): 50 minutes    Subjective: Pt seen by pain management MD (DR Shelby Gao), and to have epidural injection on 23  Pain today 23  Objective: See treatment diary below      Assessment:   Patient performed Nustep aerobic exercise to increase blood flow to the area being treated, prepare the muscles for strength training and stretching, improve overall tolerance to activity, and aerobic endurance  Progressing with lumbar flexibility, spinal stabilization, endurance activity and B LE strengthening  Pt moves slowly and deliberately with all exercise and walking  Initiated walking on TM without difficulty, patient tolerated 5 consecutive minutes  Continue PT with goal of increasing activity level with minimal pain  Plan: Progress treatment as tolerated         Precautions: 1 year history of LBP      Manuals 5/25      5/11 5/15 5/18 5/22                                                       Neuro Re-Ed #11       RE/Oswestry #9 #10   Instruction in HEP             Instruction in posture and body mechanics                          DLS tbands 88I all blue TB      20x GTB 20x GTB 20x blue TB 20x blue TB   Lateral press 20x all blue TB L/R      20x/20x GTB 20x20x GTB 20x/20x blue TB 20x/20x blue TB   Hip hinge 5x 10\" hold      5 x 10\" 5x 10\" 5 x 10\" 5x 10\"   Cat n camel 10x      10x  10x 10x   Quadruped reciprocal arm/leg lift 5x L/R         5x L/R   Bridge with abd bracing 20x blue TB      20x  20x 20x GTB 20x blue TB   DLS lift knee unsupported 20x L/R5x L/R      - 10x L/R 15x L/R 15x L/R   DLS lunge 5x L/R      5x L/R - 5x L/R 5x L/R " "  Ther Ex             K to C  LTR  PPT 5x L/R hold 5\"  5x  10x        5x  10x  10x 5x  10x  10x 5x  10  10x 5x  10x  10x   S/L clamshells 20x blue TB      20x GTB 20x GTB 20x GTB 20x blue TB   Stand lumbar ext 5x      5x 5x 5x 5x   Prone on elbows 3 min      3 min 3 min 3 min 3 min   Bird dogs 20x      10x 10x 15x 20x   Seated LAQ with DF pumps/nerve tensions 2 x 10 reps DF      2 x 10 reps DF 2 x  Reps DF 2 x 10 reps DF L/R -   Press ups 10x      10x 10x 10x 10x                Ther Activity             NUSTEP 10 min L5  Seat 11      8' 8' L5 10' L5 10' L5   walk TM 5 min  1 mph        2' level with abd bracing 2' with abdomenal bracing   Stand HT raises 10x/10x      10/10x 10x/10x 10x/10x 10x/10x   squats 10x      10x 10x 10x 10x   Step up/side Step up 5x L/R            Hip 3 ways 10x L/R      10x/10x 10x/10x 10x/10x 10x L/R   Sit to stand 10x 21\"      10x from 24\" plinthe 10x from 24\" plinthe 10x 24\" 10x 23\"   Modalities                                                     "

## 2023-05-25 ENCOUNTER — OFFICE VISIT (OUTPATIENT)
Age: 58
End: 2023-05-25

## 2023-05-25 DIAGNOSIS — M54.41 ACUTE RIGHT-SIDED LOW BACK PAIN WITH RIGHT-SIDED SCIATICA: Primary | ICD-10-CM

## 2023-05-25 DIAGNOSIS — S39.012D BACK STRAIN, SUBSEQUENT ENCOUNTER: ICD-10-CM

## 2023-06-01 ENCOUNTER — OFFICE VISIT (OUTPATIENT)
Age: 58
End: 2023-06-01

## 2023-06-01 DIAGNOSIS — M54.41 ACUTE RIGHT-SIDED LOW BACK PAIN WITH RIGHT-SIDED SCIATICA: ICD-10-CM

## 2023-06-01 DIAGNOSIS — S39.012D BACK STRAIN, SUBSEQUENT ENCOUNTER: Primary | ICD-10-CM

## 2023-06-01 NOTE — PROGRESS NOTES
"Daily Note     Today's date: 2023  Patient name: Ariadne Avilez  : 1965  MRN: [id number]  Referring provider: Deepika Hayes DO  Dx:   Encounter Diagnosis     ICD-10-CM    1  Back strain, subsequent encounter  S39 012D       2  Acute right-sided low back pain with right-sided sciatica  M54 41           Start Time: 1455  Stop Time: 1545  Total time in clinic (min): 50 minutes    Subjective: pain 10 today, states he is doing more\"things\" at home  Objective: See treatment diary below      Assessment:   Patient performed Nustep aerobic exercise to increase blood flow to the area being treated, prepare the muscles for strength training and stretching, improve overall tolerance to activity, and aerobic endurance  Progressing with DLS ex, B LE strengthening and endurance activity  Slow increase in activity endurance noted  Continue PT with goal of resuming all activity with minimal pain  Plan: Progress treatment as tolerated         Precautions: 1 year history of LBP      Manuals 5/25 6/1     5/11 5/15 5/18 5/22                                                       Neuro Re-Ed #11       RE/Oswestry #9 #10   Instruction in HEP             Instruction in posture and body mechanics                          DLS tbands 61K all blue TB 20x all blue TB     20x GTB 20x GTB 20x blue TB 20x blue TB   Lateral press 20x all blue TB L/R 20x all blue TB     20x/20x GTB 20x20x GTB 20x/20x blue TB 20x/20x blue TB   Hip hinge 5x 10\" hold 5x  10\" hold     5 x 10\" 5x 10\" 5 x 10\" 5x 10\"   Cat n camel 10x 10x     10x  10x 10x   Quadruped reciprocal arm/leg lift 5x L/R 5x L/R        5x L/R   Bridge with abd bracing 20x blue TB 20x blue TB     20x  20x 20x GTB 20x blue TB   DLS lift knee unsupported 20x L/R5x L/R 20x L/R      - 10x L/R 15x L/R 15x L/R   DLS lunge 5x L/R 5x     5x L/R - 5x L/R 5x L/R   Ther Ex             K to C  LTR  PPT 5x L/R hold 5\"  5x  10x 5x L/R  5x  10x       5x  10x  10x 5x  10x  10x 5x  10  10x " "5x  10x  10x   S/L clamshells 20x blue TB 20x blue TB     20x GTB 20x GTB 20x GTB 20x blue TB   Stand lumbar ext 5x 5x     5x 5x 5x 5x   Prone on elbows 3 min 3 min     3 min 3 min 3 min 3 min   Bird dogs 20x 20x     10x 10x 15x 20x   Seated LAQ with DF pumps/nerve tensions 2 x 10 reps DF 2 x 10 DF     2 x 10 reps DF 2 x  Reps DF 2 x 10 reps DF L/R -   Press ups 10x 10x     10x 10x 10x 10x                Ther Activity             NUSTEP 10 min L5  Seat 11 10' L5 seat 11     8' 8' L5 10' L5 10' L5   walk TM 5 min  1 mph TM 1 mph  6 min       2' level with abd bracing 2' with abdomenal bracing   Stand HT raises 10x/10x 10x/10x     10/10x 10x/10x 10x/10x 10x/10x   squats 10x 10x     10x 10x 10x 10x   Step up/side Step up 5x L/R 5x L/R           Hip 3 ways 10x L/R Vectors GTB 10x L/R     10x/10x 10x/10x 10x/10x 10x L/R   Sit to stand 10x 21\" 10x 21\"     10x from 24\" plinthe 10x from 24\" plinthe 10x 24\" 10x 23\"   Modalities                                                       "

## 2023-06-05 ENCOUNTER — APPOINTMENT (OUTPATIENT)
Age: 58
End: 2023-06-05
Payer: COMMERCIAL

## 2023-06-05 NOTE — PROGRESS NOTES
"Daily Note     Today's date: 2023  Patient name: Kelsie Hua  : 1965  MRN: [id number]  Referring provider: Mae Broussard DO  Dx:   Encounter Diagnosis     ICD-10-CM    1  Acute right-sided low back pain with right-sided sciatica  M54 41       2  Back strain, subsequent encounter  S39 012D                      Subjective:       Objective: See treatment diary below      Assessment:   Patient performed Nustep aerobic exercise to increase blood flow to the area being treated, prepare the muscles for strength training and stretching, improve overall tolerance to activity, and aerobic endurance  Plan: Progress treatment as tolerated         Precautions: 1 year history of LBP      Manuals 5/25 6/1 6/5    5/11 5/15 5/18 5/22                                                       Neuro Re-Ed #11       RE/Oswestry #9 #10   Instruction in HEP             Instruction in posture and body mechanics                          DLS tbands 86P all blue TB 20x all blue TB     20x GTB 20x GTB 20x blue TB 20x blue TB   Lateral press 20x all blue TB L/R 20x all blue TB     20x/20x GTB 20x20x GTB 20x/20x blue TB 20x/20x blue TB   Hip hinge 5x 10\" hold 5x  10\" hold     5 x 10\" 5x 10\" 5 x 10\" 5x 10\"   Cat n camel 10x 10x     10x  10x 10x   Quadruped reciprocal arm/leg lift 5x L/R 5x L/R        5x L/R   Bridge with abd bracing 20x blue TB 20x blue TB     20x  20x 20x GTB 20x blue TB   DLS lift knee unsupported 20x L/R5x L/R 20x L/R      - 10x L/R 15x L/R 15x L/R   DLS lunge 5x L/R 5x     5x L/R - 5x L/R 5x L/R   Ther Ex             K to C  LTR  PPT 5x L/R hold 5\"  5x  10x 5x L/R  5x  10x       5x  10x  10x 5x  10x  10x 5x  10  10x 5x  10x  10x   S/L clamshells 20x blue TB 20x blue TB     20x GTB 20x GTB 20x GTB 20x blue TB   Stand lumbar ext 5x 5x     5x 5x 5x 5x   Prone on elbows 3 min 3 min     3 min 3 min 3 min 3 min   Bird dogs 20x 20x     10x 10x 15x 20x   Seated LAQ with DF pumps/nerve tensions 2 x 10 reps DF 2 x 10 " "DF     2 x 10 reps DF 2 x  Reps DF 2 x 10 reps DF L/R -   Press ups 10x 10x     10x 10x 10x 10x                Ther Activity             NUSTEP 10 min L5  Seat 11 10' L5 seat 11     8' 8' L5 10' L5 10' L5   walk TM 5 min  1 mph TM 1 mph  6 min       2' level with abd bracing 2' with abdomenal bracing   Stand HT raises 10x/10x 10x/10x     10/10x 10x/10x 10x/10x 10x/10x   squats 10x 10x     10x 10x 10x 10x   Step up/side Step up 5x L/R 5x L/R           Hip 3 ways 10x L/R Vectors GTB 10x L/R     10x/10x 10x/10x 10x/10x 10x L/R   Sit to stand 10x 21\" 10x 21\"     10x from 24\" plinthe 10x from 24\" plinthe 10x 24\" 10x 23\"   Modalities                                                         "

## 2023-06-07 ENCOUNTER — APPOINTMENT (OUTPATIENT)
Age: 58
End: 2023-06-07
Payer: COMMERCIAL

## 2023-06-07 DIAGNOSIS — S39.012D BACK STRAIN, SUBSEQUENT ENCOUNTER: ICD-10-CM

## 2023-06-07 DIAGNOSIS — M54.41 ACUTE RIGHT-SIDED LOW BACK PAIN WITH RIGHT-SIDED SCIATICA: Primary | ICD-10-CM

## 2023-06-07 NOTE — PROGRESS NOTES
"Daily Note     Today's date: 2023  Patient name: Terrence Monteiro  : 1965  MRN: [id number]  Referring provider: Reggie Tolentino DO  Dx:   Encounter Diagnosis     ICD-10-CM    1  Acute right-sided low back pain with right-sided sciatica  M54 41       2  Back strain, subsequent encounter  S39 012D           Start Time: 1550  Stop Time: 1640  Total time in clinic (min): 50 minutes    Subjective: Pt received epidural injection on 23, pain range 2-4/10  Objective: See treatment diary below      Assessment:   Patient performed Nustep aerobic exercise to increase blood flow to the area being treated, prepare the muscles for strength training and stretching, improve overall tolerance to activity, and aerobic endurance  Progressing with endurance activity, B LE strengthening, trunk flexibility and DLS ex  Continue PT with goal of resuming all activity with minimal pain  Plan: Progress treatment as tolerated         Precautions: 1 year history of LBP      Manuals                                                               Neuro Re-Ed #11            Instruction in HEP             Instruction in posture and body mechanics                          DLS tbands 72Z all blue TB 20x all blue TB 20x all blue TB          Lateral press 20x all blue TB L/R 20x all blue TB 20x all blue TB          Hip hinge 5x 10\" hold 5x  10\" hold 5x hold 5\"          Cat n camel 10x 10x 10x          Quadruped reciprocal arm/leg lift 5x L/R 5x L/R 7x          Bridge with abd bracing 20x blue TB 20x blue TB 20x blue TB          DLS lift knee unsupported 20x L/R5x L/R 20x L/R  20x L/R          DLS lunge 5x L/R 5x 5x L/R          Ther Ex             K to C  LTR  PPT 5x L/R hold 5\"  5x  10x 5x L/R  5x  10x 5x  5x  10x            S/L clamshells 20x blue TB 20x blue TB 20x blue TB          Stand lumbar ext 5x 5x -          Prone on elbows 3 min 3 min - * create lift- good body mechanics floor to waist  10x 5#       " "  Bird dogs 20x 20x 20x          Seated LAQ with DF pumps/nerve tensions 2 x 10 reps DF 2 x 10 DF 2 x 10 DF L/R          Press ups 10x 10x 10x                       Ther Activity             NUSTEP 10 min L5  Seat 11 10' L5 seat 11 10' L5          walk TM 5 min  1 mph TM 1 mph  6 min TM 1 2  7 min mph          Stand HT raises 10x/10x 10x/10x 10x/10x          squats 10x 10x 10x          Step up/side Step up 5x L/R 5x L/R 10x L/R 6\"          Hip 3 ways 10x L/R Vectors GTB 10x L/R 10x L/R GTB          Sit to stand 10x 21\" 10x 21\" 10x           Modalities                                                           "

## 2023-06-08 ENCOUNTER — OFFICE VISIT (OUTPATIENT)
Age: 58
End: 2023-06-08
Payer: COMMERCIAL

## 2023-06-08 DIAGNOSIS — M54.41 ACUTE RIGHT-SIDED LOW BACK PAIN WITH RIGHT-SIDED SCIATICA: Primary | ICD-10-CM

## 2023-06-08 DIAGNOSIS — S39.012D BACK STRAIN, SUBSEQUENT ENCOUNTER: ICD-10-CM

## 2023-06-08 PROCEDURE — 97530 THERAPEUTIC ACTIVITIES: CPT | Performed by: PHYSICAL THERAPIST

## 2023-06-08 PROCEDURE — 97110 THERAPEUTIC EXERCISES: CPT | Performed by: PHYSICAL THERAPIST

## 2023-06-08 PROCEDURE — 97112 NEUROMUSCULAR REEDUCATION: CPT | Performed by: PHYSICAL THERAPIST

## 2023-06-12 ENCOUNTER — OFFICE VISIT (OUTPATIENT)
Age: 58
End: 2023-06-12
Payer: COMMERCIAL

## 2023-06-12 DIAGNOSIS — S39.012D BACK STRAIN, SUBSEQUENT ENCOUNTER: ICD-10-CM

## 2023-06-12 DIAGNOSIS — M54.41 ACUTE RIGHT-SIDED LOW BACK PAIN WITH RIGHT-SIDED SCIATICA: Primary | ICD-10-CM

## 2023-06-12 PROCEDURE — 97112 NEUROMUSCULAR REEDUCATION: CPT

## 2023-06-12 PROCEDURE — 97110 THERAPEUTIC EXERCISES: CPT

## 2023-06-12 PROCEDURE — 97530 THERAPEUTIC ACTIVITIES: CPT

## 2023-06-12 NOTE — PROGRESS NOTES
"Daily Note     Today's date: 2023  Patient name: Javier Min  : 1965  MRN: [id number]  Referring provider: Yael Thompson DO  Dx:   Encounter Diagnosis     ICD-10-CM    1  Acute right-sided low back pain with right-sided sciatica  M54 41       2  Back strain, subsequent encounter  S39 012D           Subjective: Patient noted he went on walks over the weekend  Patient noted he worked in the garden  Patient noted \" I did everything with my back brace on  \" Patient noted that he received a back injection last week  Patient asked therapist to look at incsion site due to noted itchy feeling  Therapist assessed area of injection small amount of redness present  around injection area size of a pimple  Discussed with patient to follow up with doctor if symptoms progress  Objective: See treatment diary below      Assessment: Tolerated treatment fair  Patient was able to perform listed exercises without complaints and correct form  VC for dosage of exercises throughout treatment  Patient would benefit from continued PT      Plan: Continue per plan of care        Precautions: 1 year history of LBP      Manuals                                                              Neuro Re-Ed #11            Instruction in HEP             Instruction in posture and body mechanics                          DLS tbands 33J all blue TB 20x all blue TB 20x all blue TB 20x ea BLUE         Lateral press 20x all blue TB L/R 20x all blue TB 20x all blue TB 20x ea BLUE         Hip hinge 5x 10\" hold 5x  10\" hold 5x hold 5\" 5x 5\" hold         Cat n camel 10x 10x 10x 10 x          Quadruped reciprocal arm/leg lift 5x L/R 5x L/R 7x 8 x          Bridge with abd bracing 20x blue TB 20x blue TB 20x blue TB 20x TB Blue         DLS lift knee unsupported 20x L/R5x L/R 20x L/R  20x L/R          DLS lunge 5x L/R 5x 5x L/R 5 x  L/R         Ther Ex             K to C  LTR  PPT 5x L/R hold 5\"  5x  10x 5x L/R  5x  10x " "5x  5x  10x 5x  5x  10 x             S/L clamshells 20x blue TB 20x blue TB 20x blue TB 20 x blue TB         Stand lumbar ext 5x 5x -          Prone on elbows 3 min 3 min - * create lift- good body mechanics floor to waist  10x 5#         Bird dogs 20x 20x 20x          Seated LAQ with DF pumps/nerve tensions 2 x 10 reps DF 2 x 10 DF 2 x 10 DF L/R 2 x 10 DF L/R         Press ups 10x 10x 10x 10 x                       Ther Activity             NUSTEP 10 min L5  Seat 11 10' L5 seat 11 10' L5 10' L5         walk TM 5 min  1 mph TM 1 mph  6 min TM 1 2  7 min mph TM 1 2  7 min mph         Stand HT raises 10x/10x 10x/10x 10x/10x 10 x 10         squats 10x 10x 10x 10 x          Step up/side Step up 5x L/R 5x L/R 10x L/R 6\" 10 x L/R 6\"          Hip 3 ways 10x L/R Vectors GTB 10x L/R 10x L/R GTB 10x L/R GTB         Sit to stand 10x 21\" 10x 21\" 10x  10 x          Modalities                                                             "

## 2023-06-15 ENCOUNTER — OFFICE VISIT (OUTPATIENT)
Age: 58
End: 2023-06-15
Payer: COMMERCIAL

## 2023-06-15 DIAGNOSIS — M54.41 ACUTE RIGHT-SIDED LOW BACK PAIN WITH RIGHT-SIDED SCIATICA: Primary | ICD-10-CM

## 2023-06-15 DIAGNOSIS — S39.012D BACK STRAIN, SUBSEQUENT ENCOUNTER: ICD-10-CM

## 2023-06-15 PROCEDURE — 97530 THERAPEUTIC ACTIVITIES: CPT | Performed by: PHYSICAL THERAPIST

## 2023-06-15 PROCEDURE — 97112 NEUROMUSCULAR REEDUCATION: CPT | Performed by: PHYSICAL THERAPIST

## 2023-06-15 PROCEDURE — 97110 THERAPEUTIC EXERCISES: CPT | Performed by: PHYSICAL THERAPIST

## 2023-06-15 NOTE — PROGRESS NOTES
"Daily Note     Today's date: 6/15/2023  Patient name: Garima Navarrete  : 1965  MRN: [id number]  Referring provider: Syed Hoover DO  Dx:   Encounter Diagnosis     ICD-10-CM    1  Acute right-sided low back pain with right-sided sciatica  M54 41       2  Back strain, subsequent encounter  S39 012D           Start Time: 1600  Stop Time: 1650  Total time in clinic (min): 50 minutes    Subjective:       Objective: See treatment diary below      Assessment:   Patient performed Nustep aerobic exercise to increase blood flow to the area being treated, prepare the muscles for strength training and stretching, improve overall tolerance to activity, and aerobic endurance  Pt tolerating 10 min on TM at 1 5 mph without increase in symptoms  Improving endurance, progressing with lumbar stabilization and B LE strengthening program   Continuing with education in body mechanics for lifting  Continue PT 2x/week with goal of resuming activity with minimal pain  Plan: Progress treatment as tolerated         Precautions: 1 year history of LBP      Manuals 5/25 6/1 6/8 6/12 6/15                                                            Neuro Re-Ed #11            Instruction in HEP             Instruction in posture and body mechanics                          DLS tbands 05A all blue TB 20x all blue TB 20x all blue TB 20x ea BLUE 20x all blue TB        Lateral press 20x all blue TB L/R 20x all blue TB 20x all blue TB 20x ea BLUE 20 x all blue TB        Hip hinge 5x 10\" hold 5x  10\" hold 5x hold 5\" 5x 5\" hold 5x hold 10\"        Cat n camel 10x 10x 10x 10 x  10x        Quadruped reciprocal arm/leg lift 5x L/R 5x L/R 7x 8 x  10x        Bridge with abd bracing 20x blue TB 20x blue TB 20x blue TB 20x TB Blue 20x Blue TB        DLS lift knee unsupported 20x L/R5x L/R 20x L/R  20x L/R  20x        DLS lunge 5x L/R 5x 5x L/R 5 x  L/R 5x L/R        Ther Ex             K to C  LTR  PPT 5x L/R hold 5\"  5x  10x 5x L/R  5x  10x " "5x  5x  10x 5x  5x  10 x   -          S/L clamshells 20x blue TB 20x blue TB 20x blue TB 20 x blue TB 20x blue TB        Stand lumbar ext 5x 5x -  5x        Prone on elbows 3 min 3 min - * create lift- good body mechanics floor to waist  10x 5# 20x 5# with VC for good body mechanics  110x        Bird dogs 20x 20x 20x  20x        Seated LAQ with DF pumps/nerve tensions 2 x 10 reps DF 2 x 10 DF 2 x 10 DF L/R 2 x 10 DF L/R 2 x 10 DF L/R        Press ups 10x 10x 10x 10 x  10x                     Ther Activity             NUSTEP 10 min L5  Seat 11 10' L5 seat 11 10' L5 10' L5 10' L5        walk TM 5 min  1 mph TM 1 mph  6 min TM 1 2  7 min mph TM 1 2  7 min mph TM 1  5mph  10'        Stand HT raises 10x/10x 10x/10x 10x/10x 10 x 10 10x/10x        squats 10x 10x 10x 10 x  10x        Step up/side Step up 5x L/R 5x L/R 10x L/R 6\" 10 x L/R 6\"  10x up/side L/R 6\"        Hip 3 ways 10x L/R Vectors GTB 10x L/R 10x L/R GTB 10x L/R GTB 10x L/R GTB        Sit to stand 10x 21\" 10x 21\" 10x  10 x  10x        Modalities                                                               "

## 2023-06-15 NOTE — PROGRESS NOTES
PT Re-Evaluation     Today's date: 6/15/2023  Patient name: Adam Watkins  : 1965  MRN: [id number]  Referring provider: Tanvi Mcdaniel DO  Dx:   Encounter Diagnosis     ICD-10-CM    1  Back strain, subsequent encounter  S39 012D       2  Acute right-sided low back pain with right-sided sciatica  M54 41                      Assessment  Assessment details: The patient is a 59year old man 1 year post onset of this bout of LBP  Pain range 3-5/10  Pt has radiating pain R LE to posterior knee  Pt noting minimal improvement  Modest improvement with ROM and strength noted, however Oswestry improved from 32% disability to 16% disability  Pt working full time without restriction, but states pain increases by the end of the day, notes he is slow and stiff in the morning  Pain with ascending and descending steps, goes 1 at a time, non reciproacally  Pain R LS/hip persists  Plan to continue PT 4-6 weeks with goal of increasing activity level with minimal pain  Impairments: abnormal or restricted ROM, activity intolerance, impaired physical strength, lacks appropriate home exercise program, pain with function and poor body mechanics  Functional limitations: standing all day with pain, walking 15 min, 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; steps non reciprocally  Symptom irritability: moderateUnderstanding of Dx/Px/POC: fair   Prognosis: fair  Prognosis details: 1 year post onset of pain, has had previous course of PT    Goals  STG- 4 weeks 5/15/23  1  I HEP (MET)  2  Instruct patient in proper posture and body mechanics(MET)  3  Improve patients self awareness of posture and body mechanics (progressing)  4  Centralize R LE symptoms (NOT MET)  5  Decrease pain range to 0-5/10 (NOT MET)    STG- 4 weeks 23-   1  Centralize R LE symptoms  2  Decrease pain range to 2-4/10  3  Up/down steps reciprocally  4  Walk 20-30 minutes consecutively        LTG- 12 weeks 7/10/23  1  Decrease pain range to 0-2  2   Resolve radiating radicular symptoms  3  Improve B hip strength to 4+-5/5  4  Improve trunk ROM to WNL  5  Improve Oswestry score 15-20 points from IE    Plan  Patient would benefit from: PT eval and skilled physical therapy  Referral necessary: No  Planned modality interventions: cryotherapy and thermotherapy: hydrocollator packs  Planned therapy interventions: manual therapy, joint mobilization, neuromuscular re-education, postural training, patient education, therapeutic activities, therapeutic exercise and home exercise program  Frequency: 2x week  Duration in visits: 8  Duration in weeks: 16  Plan of Care beginning date: 5/15/2023  Plan of Care expiration date: 7/10/2023  Treatment plan discussed with: patient        Subjective Evaluation    History of Present Illness  Date of onset: 3/22/2023  Mechanism of injury: 5/15/23- Pt states he is less painful  Able to do small amounts of gardening  Pt able to walk 15 min  States radiating LE pain is less intense  Pain with up and down steps  Pt states he received a chiropractic treatment yesterday  Pt was at work 1 year ago and was lifting a box and twisted and felt a pop  Pt works at [employer]  Pt seen in [place] [address]  Pt received lidocaine patch and muscle relaxant at that time  Pt received PT for 3 weeks with some pain relief  Pt currently working without restriction  Pt has had another round of PT for 6 months, and was DC from that facility  Pt received epidural injection 2 weeks ago and had 3 days of pain relief  Pt currently in litigation against [employer] for a [employer] claim  Pt referred for outpatient PT  Imaging revealed Lumbar disc bulge L 1-2, L 2-3, and HNPL 3-4, L5-S1, R S1 radiculopathy per EMG            Recurrent probem    Quality of life: fair    Pain  Current pain ratin  At best pain rating: 3  At worst pain ratin  Location: pain radiating from R LS, and radiating to posterior R knee  Quality: burning, throbbing and sharp  Relieving factors: ice and rest  Progression: no change    Social Support  Steps to enter house: yes  3  Stairs in house: yes   13  Lives in: multiple-level home  Lives with: spouse    Employment status: working  Hand dominance: right      Diagnostic Tests  X-ray: abnormal  MRI studies: abnormal  Treatments  Previous treatment: physical therapy  Current treatment: physical therapy  Patient Goals  Patient goals for therapy: decreased pain  Patient goal: decreased pain        Objective     Concurrent Complaints  Negative for night pain, disturbed sleep, bladder dysfunction, bowel dysfunction, saddle (S4) numbness, cardiac problem, kidney problem, gallbladder problem, stomach problem, ulcer, appendix problem, spleen problem, pancreas problem, history of cancer, history of trauma and infection    Postural Observations  Seated posture: fair  Standing posture: fair    Additional Postural Observation Details  Decreased lumbar lordosis, forward head and shoulders    Tenderness     Lumbar Spine  Tenderness in the spinous process  Right Hip   Tenderness in the PSIS  Additional Tenderness Details  R PSIS and lower lumbar paraspinals very painful    Neurological Testing     Sensation     Lumbar   Left   Intact: light touch    Right   Intact: light touch  Paresthesia: light touch    Additional Neurological Details  Radiating pain from R buttock to posterior R knee    Active Range of Motion     Lumbar   Flexion:  Restriction level: moderate  Extension:  Restriction level: minimal  Left lateral flexion:  Restriction level: minimal  Right lateral flexion:  Restriction level: minimal  Left rotation:  WFL  Right rotation:  Restriction level: minimal    Additional Active Range of Motion Details  Fingertip to floor: For Flx/40cm, SB L/57cm, SB/R/54cm    5/15/23- fingertip to floor:  For Flx/ 38cm   SB L/54cm, SB R/53 5cm,   Mechanical Assessment    Cervical      Thoracic    Lying extension:   Pain location: "centralized  Pain intensity: better  Pain level: decreased    Lumbar    Standing extension: repeated movements  Pain location: no change    Strength/Myotome Testing     Additional Strength Details  Trunk flx 3/5, ext 4/5  5/15- trunk flx 4/5, ext 4/5    Tests     Lumbar     Left   Negative passive SLR  Right   Negative passive SLR       Additional Tests Details  Pain decreased with B zahra traction    4/17/23- TUG/18\", 5x STS/35\"    5/15/23- TUG/15\", 5x STS/33\"    General Comments:      Lumbar Comments  4/17/23- Oswestry 32%    5/15/23- Modified Oswestry 16%    Hip Comments   R hip IR ROM moderately limited    Knee Comments  B squat 50%, unable to rise to stand without use of hands from 18\" cahir, no hands from 24\" chair    Ankle/Foot Comments   HT walk intact             Precautions: 1 year history of LBP      Manuals 5/25 6/1 6/8 6/12 6/15 6/19                                                           Neuro Re-Ed #11            Instruction in HEP             Instruction in posture and body mechanics                          DLS tbands 48J all blue TB 20x all blue TB 20x all blue TB 20x ea BLUE 20x all blue TB        Lateral press 20x all blue TB L/R 20x all blue TB 20x all blue TB 20x ea BLUE 20 x all blue TB        Hip hinge 5x 10\" hold 5x  10\" hold 5x hold 5\" 5x 5\" hold 5x hold 10\"        Cat n camel 10x 10x 10x 10 x  10x        Quadruped reciprocal arm/leg lift 5x L/R 5x L/R 7x 8 x  10x        Bridge with abd bracing 20x blue TB 20x blue TB 20x blue TB 20x TB Blue 20x Blue TB        DLS lift knee unsupported 20x L/R5x L/R 20x L/R  20x L/R  20x        DLS lunge 5x L/R 5x 5x L/R 5 x  L/R 5x L/R        Ther Ex             K to C  LTR  PPT 5x L/R hold 5\"  5x  10x 5x L/R  5x  10x 5x  5x  10x 5x  5x  10 x   -          S/L clamshells 20x blue TB 20x blue TB 20x blue TB 20 x blue TB 20x blue TB        Stand lumbar ext 5x 5x -  5x        Prone on elbows 3 min 3 min - * create lift- good body mechanics floor to waist " " 10x 5# 20x 5# with VC for good body mechanics  110x        Bird dogs 20x 20x 20x  20x        Seated LAQ with DF pumps/nerve tensions 2 x 10 reps DF 2 x 10 DF 2 x 10 DF L/R 2 x 10 DF L/R 2 x 10 DF L/R        Press ups 10x 10x 10x 10 x  10x                     Ther Activity             NUSTEP 10 min L5  Seat 11 10' L5 seat 11 10' L5 10' L5 10' L5        walk TM 5 min  1 mph TM 1 mph  6 min TM 1 2  7 min mph TM 1 2  7 min mph TM 1  5mph  10'        Stand HT raises 10x/10x 10x/10x 10x/10x 10 x 10 10x/10x        squats 10x 10x 10x 10 x  10x        Step up/side Step up 5x L/R 5x L/R 10x L/R 6\" 10 x L/R 6\"  10x up/side L/R 6\"        Hip 3 ways 10x L/R Vectors GTB 10x L/R 10x L/R GTB 10x L/R GTB 10x L/R GTB        Sit to stand 10x 21\" 10x 21\" 10x  10 x  10x        Modalities                                                        "

## 2023-06-19 ENCOUNTER — OFFICE VISIT (OUTPATIENT)
Age: 58
End: 2023-06-19
Payer: COMMERCIAL

## 2023-06-19 DIAGNOSIS — M54.41 ACUTE RIGHT-SIDED LOW BACK PAIN WITH RIGHT-SIDED SCIATICA: ICD-10-CM

## 2023-06-19 DIAGNOSIS — S39.012D BACK STRAIN, SUBSEQUENT ENCOUNTER: Primary | ICD-10-CM

## 2023-06-19 PROCEDURE — 97530 THERAPEUTIC ACTIVITIES: CPT | Performed by: PHYSICAL THERAPIST

## 2023-06-19 PROCEDURE — 97110 THERAPEUTIC EXERCISES: CPT | Performed by: PHYSICAL THERAPIST

## 2023-06-19 PROCEDURE — 97112 NEUROMUSCULAR REEDUCATION: CPT | Performed by: PHYSICAL THERAPIST

## 2023-06-19 NOTE — PROGRESS NOTES
"Daily Note     Today's date: 2023  Patient name: Flora Cintron  : 1965  MRN: [id number]  Referring provider: Wero Corado DO  Dx:   Encounter Diagnosis     ICD-10-CM    1  Back strain, subsequent encounter  S39 012D       2  Acute right-sided low back pain with right-sided sciatica  M54 41           Start Time: 1555  Stop Time: 1645  Total time in clinic (min): 50 minutes    Subjective: Pain 4/10 today  Objective: See treatment diary below      Assessment:   Patient performed Nustep aerobic exercise to increase blood flow to the area being treated, prepare the muscles for strength training and stretching, improve overall tolerance to activity, and aerobic endurance  Tolerating increasing lengths of time for walking  Progressing with endurance activity, DLS, and B LE strengthening  Continue PT with goal of resuming activity with minimal pain  Plan: Progress treatment as tolerated         Precautions: 1 year history of LBP      Manuals 5/25 6/1 6/8 6/12 6/15 6/19                                                           Neuro Re-Ed #11            Instruction in HEP             Instruction in posture and body mechanics                          DLS tbands 44V all blue TB 20x all blue TB 20x all blue TB 20x ea BLUE 20x all blue TB 20x all blue TB       Lateral press 20x all blue TB L/R 20x all blue TB 20x all blue TB 20x ea BLUE 20 x all blue TB 20x all blue TB       Hip hinge 5x 10\" hold 5x  10\" hold 5x hold 5\" 5x 5\" hold 5x hold 10\" 5x hold 5\"       Cat n camel 10x 10x 10x 10 x  10x 10x       Quadruped reciprocal arm/leg lift 5x L/R 5x L/R 7x 8 x  10x 10x       Bridge with abd bracing 20x blue TB 20x blue TB 20x blue TB 20x TB Blue 20x Blue TB 20x blue TB       DLS lift knee unsupported 20x L/R5x L/R 20x L/R  20x L/R  20x 20x       DLS lunge 5x L/R 5x 5x L/R 5 x  L/R 5x L/R 5x L/R       Ther Ex             K to C  LTR  PPT 5x L/R hold 5\"  5x  10x 5x L/R  5x  10x 5x  5x  10x 5x  5x  10 x   - " "  -       S/L clamshells 20x blue TB 20x blue TB 20x blue TB 20 x blue TB 20x blue TB 20x blue TB       Stand lumbar ext 5x 5x -  5x -       Prone on elbows 3 min 3 min - * create lift- good body mechanics floor to waist  10x 5# 20x 5# with VC for good body mechanics  110x 20x 10# with good body mechanics floor to waist       Bird dogs 20x 20x 20x  20x 20x       Seated LAQ with DF pumps/nerve tensions 2 x 10 reps DF 2 x 10 DF 2 x 10 DF L/R 2 x 10 DF L/R 2 x 10 DF L/R HEP       Press ups 10x 10x 10x 10 x  10x 10x                    Ther Activity             NUSTEP 10 min L5  Seat 11 10' L5 seat 11 10' L5 10' L5 10' L5 8' L5       walk TM 5 min  1 mph TM 1 mph  6 min TM 1 2  7 min mph TM 1 2  7 min mph TM 1  5mph  10' TM 12'  1 5 mph       Stand HT raises 10x/10x 10x/10x 10x/10x 10 x 10 10x/10x 10x/10x       squats 10x 10x 10x 10 x  10x 10x       Step up/side Step up 5x L/R 5x L/R 10x L/R 6\" 10 x L/R 6\"  10x up/side L/R 6\" 10x up/side 6\" L/R       Hip 3 ways 10x L/R Vectors GTB 10x L/R 10x L/R GTB 10x L/R GTB 10x L/R GTB 10x GTB       Sit to stand 10x 21\" 10x 21\" 10x  10 x  10x 10x       Modalities                                                                 "

## 2023-06-22 ENCOUNTER — EVALUATION (OUTPATIENT)
Age: 58
End: 2023-06-22
Payer: COMMERCIAL

## 2023-06-22 DIAGNOSIS — M54.41 ACUTE RIGHT-SIDED LOW BACK PAIN WITH RIGHT-SIDED SCIATICA: ICD-10-CM

## 2023-06-22 DIAGNOSIS — S39.012D BACK STRAIN, SUBSEQUENT ENCOUNTER: Primary | ICD-10-CM

## 2023-06-22 PROCEDURE — 97164 PT RE-EVAL EST PLAN CARE: CPT

## 2023-06-22 PROCEDURE — 97110 THERAPEUTIC EXERCISES: CPT

## 2023-06-22 PROCEDURE — 97112 NEUROMUSCULAR REEDUCATION: CPT

## 2023-06-22 PROCEDURE — 97530 THERAPEUTIC ACTIVITIES: CPT

## 2023-06-22 NOTE — LETTER
2023    Dajuan Gallegos DO  [address]   [address]  [address]    Patient: Jad Jaimes   YOB: 1965   Date of Visit: 2023     Encounter Diagnosis     ICD-10-CM    1  Back strain, subsequent encounter  S39 012D       2  Acute right-sided low back pain with right-sided sciatica  M54 41           Dear Dr Uma Saravia: Thank you for your recent referral of Jad Jaimes  Please review the attached evaluation summary from Amauri's recent visit  Please verify that you agree with the plan of care by signing the attached order  If you have any questions or concerns, please do not hesitate to call  I sincerely appreciate the opportunity to share in the care of one of your patients and hope to have another opportunity to work with you in the near future  Sincerely,    Rose Galdamez, PT      Referring Provider:      I certify that I have read the below Plan of Care and certify the need for these services furnished under this plan of treatment while under my care  Dajuan Gallegos DO  [address]   [address] [facility] [address]  [address] [facility]  Via Fax: [phone]          PT Re-Evaluation     Today's date: 2023  Patient name: Jad Jaimes  : 1965  MRN: [id number]  Referring provider: Xochitl Jenkins DO  Dx:   Encounter Diagnosis     ICD-10-CM    1  Back strain, subsequent encounter  S39 012D       2  Acute right-sided low back pain with right-sided sciatica  M54 41           Start Time: 1600  Stop Time: 1645  Total time in clinic (min): 45 minutes    Assessment  Assessment details: The patient is a 59year old man 1 year post onset of this bout of LBP  Pain range 4/10  Pt has radiating pain R LE to R mid hamstring  Symptoms centralized with lumbar extensions in standing to the R glute  Modest improvement with ROM and strength noted, however Oswestry improved from 16% disability to 10% disability    Pt working full time without restriction, but states pain increases by the end of the day, notes he is slow and stiff in the morning  Stiffness does improve with standing lumbar extension  Pain with ascending and descending steps however, pt is now able reciprocally negotiate stairs  goes 1 at a time, non reciproacally  Plan to continue PT until follow up appointment with his Doctor to determine if further course of action is required, with goal of increasing activity level with minimal pain  Impairments: abnormal or restricted ROM, activity intolerance, impaired physical strength, lacks appropriate home exercise program, pain with function and poor body mechanics  Functional limitations: standing all day with pain, walking 15 min, 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; steps non reciprocally  Symptom irritability: moderateUnderstanding of Dx/Px/POC: fair   Prognosis: fair  Prognosis details: 1 year post onset of pain, has had previous course of PT    Goals  STG- 4 weeks 5/15/23  1  I HEP (MET)  2  Instruct patient in proper posture and body mechanics(MET)  3  Improve patients self awareness of posture and body mechanics (progressing)  4  Centralize R LE symptoms (progressing)  5  Decrease pain range to 0-5/10 (progressing)    STG- 4 weeks 6/12/23-   1  Centralize R LE symptoms (progressing)  2  Decrease pain range to 2-4/10 (progressing)  3  Up/down steps reciprocally (MET)  4  Walk 20-30 minutes consecutively (progressing)        LTG- 12 weeks 7/10/23  1  Decrease pain range to 0-2 (progressing)  2  Resolve radiating radicular symptoms (progressing)  3  Improve B hip strength to 4+-5/5 (progressing)  4  Improve trunk ROM to WNL (MET)  5   Improve Oswestry score 15-20 points from IE (MET)    Plan  Patient would benefit from: skilled physical therapy and PT eval  Referral necessary: No  Planned modality interventions: cryotherapy and thermotherapy: hydrocollator packs  Planned therapy interventions: manual therapy, joint mobilization, neuromuscular re-education, postural training, patient education, therapeutic activities, therapeutic exercise and home exercise program  Frequency: 2x week  Duration in visits: 6  Duration in weeks: 3  Plan of Care beginning date: 6/22/2023  Plan of Care expiration date: 7/13/2023  Treatment plan discussed with: patient        Subjective Evaluation    History of Present Illness  Date of onset: 3/22/2023  Mechanism of injury: RE: 6/22/23- Pt reports he has 4/10 pain today  Sarah Tavarez saw his pain management Doctor and it was determined they will be performing injections in the low back on 7/5/23  Pt reports that he is not limited with gardening contained to small pots  Notes he does not lift soil bags but does place his pots at waist height  Pt is still able to walk 15 minutes before requiring rest breaks  Notes he has been working on incorporating more walking into his week  Pt reports he still gets pain going up down the steps but notes it is less painful than before  Pt is using reciprocal pattern  Pt is still seeing the chiropractor  RE: 5/15/23- Pt states he is less painful  Able to do small amounts of gardening  Pt able to walk 15 min  States radiating LE pain is less intense  Pain with up and down steps  Pt states he received a chiropractic treatment yesterday  IE: Pt was at work 1 year ago and was lifting a box and twisted and felt a pop  Pt works at [employer]  Pt seen in [place] [address]  Pt received lidocaine patch and muscle relaxant at that time  Pt received PT for 3 weeks with some pain relief  Pt currently working without restriction  Pt has had another round of PT for 6 months, and was DC from that facility  Pt received epidural injection 2 weeks ago and had 3 days of pain relief  Pt currently in litigation against [employer] for a Bong's claim  Pt referred for outpatient PT  Imaging revealed Lumbar disc bulge L 1-2, L 2-3, and HNPL 3-4, L5-S1, R S1 radiculopathy per EMG  Recurrent probem    Quality of life: fair    Pain  Current pain ratin (low back > R glute > 1/2 way into hamstring)  At best pain rating: 3  At worst pain ratin  Quality: sharp (electricity like)  Relieving factors: ice, rest and medications (ms relaxers prn)  Progression: improved    Social Support  Steps to enter house: yes  3  Stairs in house: yes   13  Lives in: multiple-level home  Lives with: spouse    Employment status: working  Hand dominance: right      Diagnostic Tests  X-ray: abnormal  MRI studies: abnormal  Treatments  Previous treatment: physical therapy  Current treatment: physical therapy  Patient Goals  Patient goals for therapy: decreased pain  Patient goal: decreased pain (in progress)        Objective     Concurrent Complaints  Negative for night pain, disturbed sleep, bladder dysfunction, bowel dysfunction, saddle (S4) numbness, cardiac problem, kidney problem, gallbladder problem, stomach problem, ulcer, appendix problem, spleen problem, pancreas problem, history of cancer, history of trauma and infection    Postural Observations  Seated posture: fair  Standing posture: fair    Additional Postural Observation Details  Decreased lumbar lordosis, forward head and shoulders    Tenderness     Lumbar Spine  No tenderness in the spinous process  Right Hip   Tenderness in the PSIS  Additional Tenderness Details  R PSIS     Neurological Testing     Sensation     Lumbar   Left   Intact: light touch    Right   Intact: light touch  Paresthesia: light touch    Additional Neurological Details  Radiating pain from R buttock to mid R hamstring    Active Range of Motion     Lumbar   Flexion:  Restriction level: minimal  Extension:  Restriction level: minimal  Left lateral flexion:  Restriction level: minimal  Right lateral flexion:  Restriction level: minimal  Left rotation:  WFL  Right rotation:  [facility]    Additional Active Range of Motion Details  Fingertip to floor:  For Flx/40cm, SB L/57cm, "SB/R/54cm    5/15/23- fingertip to floor: For Flx/ 38cm   SB L/54cm, SB R/53 5cm,     6/22/23- fingertip to floor: For Flx/ 30cm   SB L/54cm, SB R 54cm  Mechanical Assessment    Cervical      Thoracic    Lying extension:   Pain location: centralized  Pain intensity: better  Pain level: decreased    Lumbar    Standing extension: repeated movements  Pain location: centralized    Strength/Myotome Testing     Additional Strength Details  Trunk flx 3/5, ext 4/5  5/15- trunk flx 4/5, ext 4/5    6/22- trunk flx 4/5, ext 4/5    Tests     Lumbar     Left   Negative passive SLR  Right   Negative passive SLR       Additional Tests Details  Pain decreased with B zahra traction    4/17/23- TUG/18\", 5x STS/35\"    5/15/23- TUG/15\", 5x STS/33\"    6/22/23- TUG/13\", 5x STS/31\"    General Comments:      Lumbar Comments  4/17/23- Oswestry 32%    5/15/23- Modified Oswestry 16%    6/22/23- Oswestry 10%    Hip Comments   R hip IR ROM moderately limited    6/22/23- R IR AROM 18 degrees    Knee Comments  B squat 50%, unable to rise to stand without use of hands from 18\" chair, no hands from 24\" chair    6/22/23: able to perform STS from 18\" chair with no hands,     Ankle/Foot Comments   HT walk intact            Precautions: 1 year history of LBP      Manuals 5/25 6/1 6/8 6/12 6/15 6/19 6/22                                             RE       RR      Neuro Re-Ed #11            Instruction in HEP             Instruction in posture and body mechanics                          DLS tbands 13U all blue TB 20x all blue TB 20x all blue TB 20x ea BLUE 20x all blue TB 20x all blue TB       Lateral press 20x all blue TB L/R 20x all blue TB 20x all blue TB 20x ea BLUE 20 x all blue TB 20x all blue TB       Hip hinge 5x 10\" hold 5x  10\" hold 5x hold 5\" 5x 5\" hold 5x hold 10\" 5x hold 5\"       Cat n camel 10x 10x 10x 10 x  10x 10x       Quadruped reciprocal arm/leg lift 5x L/R 5x L/R 7x 8 x  10x 10x 10x      Bridge with abd bracing 20x blue TB 20x " "blue TB 20x blue TB 20x TB Blue 20x Blue TB 20x blue TB       DLS lift knee unsupported 20x L/R5x L/R 20x L/R  20x L/R  20x 20x 20x      DLS lunge 5x L/R 5x 5x L/R 5 x  L/R 5x L/R 5x L/R  5x L/R      Ther Ex             K to C  LTR  PPT 5x L/R hold 5\"  5x  10x 5x L/R  5x  10x 5x  5x  10x 5x  5x  10 x   -   -       S/L clamshells 20x blue TB 20x blue TB 20x blue TB 20 x blue TB 20x blue TB 20x blue TB       Stand lumbar ext 5x 5x -  5x -       Prone on elbows 3 min 3 min - * create lift- good body mechanics floor to waist  10x 5# 20x 5# with VC for good body mechanics  110x 20x 10# with good body mechanics floor to waist 20x 10# with good body mechanics floor to waist      Bird dogs 20x 20x 20x  20x 20x       Seated LAQ with DF pumps/nerve tensions 2 x 10 reps DF 2 x 10 DF 2 x 10 DF L/R 2 x 10 DF L/R 2 x 10 DF L/R HEP       Press ups 10x 10x 10x 10 x  10x 10x                    Ther Activity             NUSTEP 10 min L5  Seat 11 10' L5 seat 11 10' L5 10' L5 10' L5 8' L5 5' L5      walk TM 5 min  1 mph TM 1 mph  6 min TM 1 2  7 min mph TM 1 2  7 min mph TM 1  5mph  10' TM 12'  1 5 mph TM 12'  1 5 mph (0 28)      Stand HT raises 10x/10x 10x/10x 10x/10x 10 x 10 10x/10x 10x/10x 10x/10x      squats 10x 10x 10x 10 x  10x 10x  10x      Step up/side Step up 5x L/R 5x L/R 10x L/R 6\" 10 x L/R 6\"  10x up/side L/R 6\" 10x up/side 6\" L/R       Hip 3 ways 10x L/R Vectors GTB 10x L/R 10x L/R GTB 10x L/R GTB 10x L/R GTB 10x GTB       Sit to stand 10x 21\" 10x 21\" 10x  10 x  10x 10x 10x      Modalities                                              Attestation signed by Karen Flores PT at 6/22/2023  6:05 PM:  I supervised the visit  We discussed the case to ensure appropriate continuation and progression of care and I reviewed the documentation                     "

## 2023-06-22 NOTE — PROGRESS NOTES
PT Re-Evaluation     Today's date: 2023  Patient name: Shagufta Almeida  : 1965  MRN: [id number]  Referring provider: Linwood Zamora DO  Dx:   Encounter Diagnosis     ICD-10-CM    1  Back strain, subsequent encounter  S39 012D       2  Acute right-sided low back pain with right-sided sciatica  M54 41           Start Time: 1600  Stop Time: 1645  Total time in clinic (min): 45 minutes    Assessment  Assessment details: The patient is a 59year old man 1 year post onset of this bout of LBP  Pain range 4/10  Pt has radiating pain R LE to R mid hamstring  Symptoms centralized with lumbar extensions in standing to the R glute  Modest improvement with ROM and strength noted, however Oswestry improved from 16% disability to 10% disability  Pt working full time without restriction, but states pain increases by the end of the day, notes he is slow and stiff in the morning  Stiffness does improve with standing lumbar extension  Pain with ascending and descending steps however, pt is now able reciprocally negotiate stairs  goes 1 at a time, non reciproacally  Plan to continue PT until follow up appointment with his Doctor to determine if further course of action is required, with goal of increasing activity level with minimal pain  Impairments: abnormal or restricted ROM, activity intolerance, impaired physical strength, lacks appropriate home exercise program, pain with function and poor body mechanics  Functional limitations: standing all day with pain, walking 15 min, 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; steps non reciprocally  Symptom irritability: moderateUnderstanding of Dx/Px/POC: fair   Prognosis: fair  Prognosis details: 1 year post onset of pain, has had previous course of PT    Goals  STG- 4 weeks 5/15/23  1  I HEP (MET)  2  Instruct patient in proper posture and body mechanics(MET)  3  Improve patients self awareness of posture and body mechanics (progressing)  4   Centralize R LE symptoms (progressing)  5  Decrease pain range to 0-5/10 (progressing)    STG- 4 weeks 6/12/23-   1  Centralize R LE symptoms (progressing)  2  Decrease pain range to 2-4/10 (progressing)  3  Up/down steps reciprocally (MET)  4  Walk 20-30 minutes consecutively (progressing)        LTG- 12 weeks 7/10/23  1  Decrease pain range to 0-2 (progressing)  2  Resolve radiating radicular symptoms (progressing)  3  Improve B hip strength to 4+-5/5 (progressing)  4  Improve trunk ROM to WNL (MET)  5  Improve Oswestry score 15-20 points from IE (MET)    Plan  Patient would benefit from: skilled physical therapy and PT eval  Referral necessary: No  Planned modality interventions: cryotherapy and thermotherapy: hydrocollator packs  Planned therapy interventions: manual therapy, joint mobilization, neuromuscular re-education, postural training, patient education, therapeutic activities, therapeutic exercise and home exercise program  Frequency: 2x week  Duration in visits: 6  Duration in weeks: 3  Plan of Care beginning date: 6/22/2023  Plan of Care expiration date: 7/13/2023  Treatment plan discussed with: patient        Subjective Evaluation    History of Present Illness  Date of onset: 3/22/2023  Mechanism of injury: RE: 6/22/23- Pt reports he has 4/10 pain today  Sherire Luna saw his pain management Doctor and it was determined they will be performing injections in the low back on 7/5/23  Pt reports that he is not limited with gardening contained to small pots  Notes he does not lift soil bags but does place his pots at waist height  Pt is still able to walk 15 minutes before requiring rest breaks  Notes he has been working on incorporating more walking into his week  Pt reports he still gets pain going up down the steps but notes it is less painful than before  Pt is using reciprocal pattern  Pt is still seeing the chiropractor  RE: 5/15/23- Pt states he is less painful  Able to do small amounts of gardening    Pt able to walk 15 min  States radiating LE pain is less intense  Pain with up and down steps  Pt states he received a chiropractic treatment yesterday  IE: Pt was at work 1 year ago and was lifting a box and twisted and felt a pop  Pt works at [employer]  Pt seen in [place] [address]  Pt received lidocaine patch and muscle relaxant at that time  Pt received PT for 3 weeks with some pain relief  Pt currently working without restriction  Pt has had another round of PT for 6 months, and was DC from that facility  Pt received epidural injection 2 weeks ago and had 3 days of pain relief  Pt currently in litigation against [employer] for a Bong's claim  Pt referred for outpatient PT  Imaging revealed Lumbar disc bulge L 1-2, L 2-3, and HNPL 3-4, L5-S1, R S1 radiculopathy per EMG            Recurrent probem    Quality of life: fair    Pain  Current pain ratin (low back > R glute > 1/2 way into hamstring)  At best pain rating: 3  At worst pain ratin  Quality: sharp (electricity like)  Relieving factors: ice, rest and medications (ms relaxers prn)  Progression: improved    Social Support  Steps to enter house: yes  3  Stairs in house: yes   13  Lives in: multiple-level home  Lives with: spouse    Employment status: working  Hand dominance: right      Diagnostic Tests  X-ray: abnormal  MRI studies: abnormal  Treatments  Previous treatment: physical therapy  Current treatment: physical therapy  Patient Goals  Patient goals for therapy: decreased pain  Patient goal: decreased pain (in progress)        Objective     Concurrent Complaints  Negative for night pain, disturbed sleep, bladder dysfunction, bowel dysfunction, saddle (S4) numbness, cardiac problem, kidney problem, gallbladder problem, stomach problem, ulcer, appendix problem, spleen problem, pancreas problem, history of cancer, history of trauma and infection    Postural Observations  Seated posture: fair  Standing posture: fair    Additional Postural Observation "Details  Decreased lumbar lordosis, forward head and shoulders    Tenderness     Lumbar Spine  No tenderness in the spinous process  Right Hip   Tenderness in the PSIS  Additional Tenderness Details  R PSIS     Neurological Testing     Sensation     Lumbar   Left   Intact: light touch    Right   Intact: light touch  Paresthesia: light touch    Additional Neurological Details  Radiating pain from R buttock to mid R hamstring    Active Range of Motion     Lumbar   Flexion:  Restriction level: minimal  Extension:  Restriction level: minimal  Left lateral flexion:  Restriction level: minimal  Right lateral flexion:  Restriction level: minimal  Left rotation:  WFL  Right rotation:  [facility]    Additional Active Range of Motion Details  Fingertip to floor: For Flx/40cm, SB L/57cm, SB/R/54cm    5/15/23- fingertip to floor: For Flx/ 38cm   SB L/54cm, SB R/53 5cm,     6/22/23- fingertip to floor: For Flx/ 30cm   SB L/54cm, SB R 54cm  Mechanical Assessment    Cervical      Thoracic    Lying extension:   Pain location: centralized  Pain intensity: better  Pain level: decreased    Lumbar    Standing extension: repeated movements  Pain location: centralized    Strength/Myotome Testing     Additional Strength Details  Trunk flx 3/5, ext 4/5  5/15- trunk flx 4/5, ext 4/5    6/22- trunk flx 4/5, ext 4/5    Tests     Lumbar     Left   Negative passive SLR  Right   Negative passive SLR       Additional Tests Details  Pain decreased with B zahra traction    4/17/23- TUG/18\", 5x STS/35\"    5/15/23- TUG/15\", 5x STS/33\"    6/22/23- TUG/13\", 5x STS/31\"    General Comments:      Lumbar Comments  4/17/23- Oswestry 32%    5/15/23- Modified Oswestry 16%    6/22/23- Oswestry 10%    Hip Comments   R hip IR ROM moderately limited    6/22/23- R IR AROM 18 degrees    Knee Comments  B squat 50%, unable to rise to stand without use of hands from 18\" chair, no hands from 24\" chair    6/22/23: able to perform STS from 18\" chair with no hands, " "    Ankle/Foot Comments   HT walk intact             Precautions: 1 year history of LBP      Manuals 5/25 6/1 6/8 6/12 6/15 6/19 6/22                                             RE       RR      Neuro Re-Ed #11            Instruction in HEP             Instruction in posture and body mechanics                          DLS tbands 29P all blue TB 20x all blue TB 20x all blue TB 20x ea BLUE 20x all blue TB 20x all blue TB       Lateral press 20x all blue TB L/R 20x all blue TB 20x all blue TB 20x ea BLUE 20 x all blue TB 20x all blue TB       Hip hinge 5x 10\" hold 5x  10\" hold 5x hold 5\" 5x 5\" hold 5x hold 10\" 5x hold 5\"       Cat n camel 10x 10x 10x 10 x  10x 10x       Quadruped reciprocal arm/leg lift 5x L/R 5x L/R 7x 8 x  10x 10x 10x      Bridge with abd bracing 20x blue TB 20x blue TB 20x blue TB 20x TB Blue 20x Blue TB 20x blue TB       DLS lift knee unsupported 20x L/R5x L/R 20x L/R  20x L/R  20x 20x 20x      DLS lunge 5x L/R 5x 5x L/R 5 x  L/R 5x L/R 5x L/R  5x L/R      Ther Ex             K to C  LTR  PPT 5x L/R hold 5\"  5x  10x 5x L/R  5x  10x 5x  5x  10x 5x  5x  10 x   -   -       S/L clamshells 20x blue TB 20x blue TB 20x blue TB 20 x blue TB 20x blue TB 20x blue TB       Stand lumbar ext 5x 5x -  5x -       Prone on elbows 3 min 3 min - * create lift- good body mechanics floor to waist  10x 5# 20x 5# with VC for good body mechanics  110x 20x 10# with good body mechanics floor to waist 20x 10# with good body mechanics floor to waist      Bird dogs 20x 20x 20x  20x 20x       Seated LAQ with DF pumps/nerve tensions 2 x 10 reps DF 2 x 10 DF 2 x 10 DF L/R 2 x 10 DF L/R 2 x 10 DF L/R HEP       Press ups 10x 10x 10x 10 x  10x 10x                    Ther Activity             NUSTEP 10 min L5  Seat 11 10' L5 seat 11 10' L5 10' L5 10' L5 8' L5 5' L5      walk TM 5 min  1 mph TM 1 mph  6 min TM 1 2  7 min mph TM 1 2  7 min mph TM 1  5mph  10' TM 12'  1 5 mph TM 12'  1 5 mph (0 28)      Stand HT raises 10x/10x 10x/10x " "10x/10x 10 x 10 10x/10x 10x/10x 10x/10x      squats 10x 10x 10x 10 x  10x 10x  10x      Step up/side Step up 5x L/R 5x L/R 10x L/R 6\" 10 x L/R 6\"  10x up/side L/R 6\" 10x up/side 6\" L/R       Hip 3 ways 10x L/R Vectors GTB 10x L/R 10x L/R GTB 10x L/R GTB 10x L/R GTB 10x GTB       Sit to stand 10x 21\" 10x 21\" 10x  10 x  10x 10x 10x      Modalities                                            "

## 2023-06-26 ENCOUNTER — OFFICE VISIT (OUTPATIENT)
Age: 58
End: 2023-06-26
Payer: COMMERCIAL

## 2023-06-26 DIAGNOSIS — S39.012D BACK STRAIN, SUBSEQUENT ENCOUNTER: Primary | ICD-10-CM

## 2023-06-26 DIAGNOSIS — M54.41 ACUTE RIGHT-SIDED LOW BACK PAIN WITH RIGHT-SIDED SCIATICA: ICD-10-CM

## 2023-06-26 PROCEDURE — 97110 THERAPEUTIC EXERCISES: CPT

## 2023-06-26 PROCEDURE — 97530 THERAPEUTIC ACTIVITIES: CPT

## 2023-06-26 PROCEDURE — 97112 NEUROMUSCULAR REEDUCATION: CPT

## 2023-06-26 NOTE — PROGRESS NOTES
"Daily Note     Today's date: 2023  Patient name: Skye Bell  : 1965  MRN: [id number]  Referring provider: Candance Piety, DO  Dx:   Encounter Diagnosis     ICD-10-CM    1  Back strain, subsequent encounter  S39 012D       2  Acute right-sided low back pain with right-sided sciatica  M54 41           Start Time: 1615  Stop Time: 1700  Total time in clinic (min): 45 minutes    Subjective: Patient noted he feels okay today  Objective: See treatment diary below      Assessment: Patient performed Treadmill and Nustep aerobic exercise to increase blood flow to the area being treated, prepare the muscles for strength training and stretching, improve overall tolerance to activity, and aerobic endurance  Patient performed exercises with min VCs for form  Patient noted some hip pain with lunges, however with cues this subsided  Patient noted no increased pain post session  PT educated the patient on his HEP  Patient would benefit from continued PT to allow the patient to return to his PLOF  Plan: Continue per plan of care        Precautions: 1 year history of LBP      Manuals 5/25 6/1 6/8 6/12 6/15 6/19 6/22 6/26                                            RE       RR      Neuro Re-Ed #11            Instruction in HEP             Instruction in posture and body mechanics                          DLS tbands 73O all blue TB 20x all blue TB 20x all blue TB 20x ea BLUE 20x all blue TB 20x all blue TB  20x all blue TB     Lateral press 20x all blue TB L/R 20x all blue TB 20x all blue TB 20x ea BLUE 20 x all blue TB 20x all blue TB  20x all blue TB     Hip hinge 5x 10\" hold 5x  10\" hold 5x hold 5\" 5x 5\" hold 5x hold 10\" 5x hold 5\"       Cat n camel 10x 10x 10x 10 x  10x 10x  10x     Quadruped reciprocal arm/leg lift 5x L/R 5x L/R 7x 8 x  10x 10x 10x 10x      Bridge with abd bracing 20x blue TB 20x blue TB 20x blue TB 20x TB Blue 20x Blue TB 20x blue TB       DLS lift knee unsupported 20x L/R5x L/R " "20x L/R  20x L/R  20x 20x 20x 20x     DLS lunge 5x L/R 5x 5x L/R 5 x  L/R 5x L/R 5x L/R  5x L/R 5x L/R     Ther Ex             K to C  LTR  PPT 5x L/R hold 5\"  5x  10x 5x L/R  5x  10x 5x  5x  10x 5x  5x  10 x   -   -       S/L clamshells 20x blue TB 20x blue TB 20x blue TB 20 x blue TB 20x blue TB 20x blue TB       Stand lumbar ext 5x 5x -  5x -       Prone on elbows 3 min 3 min - * create lift- good body mechanics floor to waist  10x 5# 20x 5# with VC for good body mechanics  110x 20x 10# with good body mechanics floor to waist 20x 10# with good body mechanics floor to waist 20x 10# with good body mechanics floor to waist     Bird dogs 20x 20x 20x  20x 20x  x20 ea     Seated LAQ with DF pumps/nerve tensions 2 x 10 reps DF 2 x 10 DF 2 x 10 DF L/R 2 x 10 DF L/R 2 x 10 DF L/R HEP       Press ups 10x 10x 10x 10 x  10x 10x  x10                  Ther Activity             NUSTEP 10 min L5  Seat 11 10' L5 seat 11 10' L5 10' L5 10' L5 8' L5 5' L5 5' L5     walk TM 5 min  1 mph TM 1 mph  6 min TM 1 2  7 min mph TM 1 2  7 min mph TM 1  5mph  10' TM 12'  1 5 mph TM 12'  1 5 mph (0 28) TM 12'  1 5 mph (0 28)     Stand HT raises 10x/10x 10x/10x 10x/10x 10 x 10 10x/10x 10x/10x 10x/10x 10x/10x     squats 10x 10x 10x 10 x  10x 10x  10x 10x     Step up/side Step up 5x L/R 5x L/R 10x L/R 6\" 10 x L/R 6\"  10x up/side L/R 6\" 10x up/side 6\" L/R  x10 up  Ea  6\"     Hip 3 ways 10x L/R Vectors GTB 10x L/R 10x L/R GTB 10x L/R GTB 10x L/R GTB 10x GTB       Sit to stand 10x 21\" 10x 21\" 10x  10 x  10x 10x 10x x10     Modalities                                                 "

## 2023-06-29 ENCOUNTER — OFFICE VISIT (OUTPATIENT)
Age: 58
End: 2023-06-29
Payer: COMMERCIAL

## 2023-06-29 DIAGNOSIS — M54.41 ACUTE RIGHT-SIDED LOW BACK PAIN WITH RIGHT-SIDED SCIATICA: Primary | ICD-10-CM

## 2023-06-29 DIAGNOSIS — S39.012D BACK STRAIN, SUBSEQUENT ENCOUNTER: ICD-10-CM

## 2023-06-29 PROCEDURE — 97110 THERAPEUTIC EXERCISES: CPT | Performed by: PHYSICAL THERAPIST

## 2023-06-29 PROCEDURE — 97530 THERAPEUTIC ACTIVITIES: CPT | Performed by: PHYSICAL THERAPIST

## 2023-06-29 PROCEDURE — 97112 NEUROMUSCULAR REEDUCATION: CPT | Performed by: PHYSICAL THERAPIST

## 2023-06-29 NOTE — PROGRESS NOTES
"Daily Note     Today's date: 2023  Patient name: Phuc Chatterjee  : 1965  MRN: [id number]  Referring provider: Lili Guillory DO  Dx:   Encounter Diagnosis     ICD-10-CM    1  Acute right-sided low back pain with right-sided sciatica  M54 41       2  Back strain, subsequent encounter  S39 012D           Start Time: 1600  Stop Time: 4043  Total time in clinic (min): 50 minutes    Subjective: 4/10 pain today, \"had bad day at work  \"    Objective: See treatment diary below      Assessment:   Patient performed Treadmill aerobic exercise to increase blood flow to the area being treated, prepare the muscles for strength training and stretching, improve overall tolerance to activity, and aerobic endurance  Progressing with endurance (15 min TM), DLS, B LE srengthening and lumbar flexibility ex  Contineu PT with goal of increasing activity level with minimal pain  Plan: Progress treatment as tolerated         Precautions: 1 year history of LBP      Manuals 5/25 6/1 6/8 6/12 6/15 6/19 6/22 6/26 6/29                                           RE       RR      Neuro Re-Ed #11            Instruction in HEP             Instruction in posture and body mechanics                          DLS tbands 91G all blue TB 20x all blue TB 20x all blue TB 20x ea BLUE 20x all blue TB 20x all blue TB  20x all blue TB 20x all blue    Lateral press 20x all blue TB L/R 20x all blue TB 20x all blue TB 20x ea BLUE 20 x all blue TB 20x all blue TB  20x all blue TB 20x all blue TB    Hip hinge 5x 10\" hold 5x  10\" hold 5x hold 5\" 5x 5\" hold 5x hold 10\" 5x hold 5\"   5x hold 5\"    Cat n camel 10x 10x 10x 10 x  10x 10x  10x 10x    Quadruped reciprocal arm/leg lift 5x L/R 5x L/R 7x 8 x  10x 10x 10x 10x  10x    Bridge with abd bracing 20x blue TB 20x blue TB 20x blue TB 20x TB Blue 20x Blue TB 20x blue TB   20x blue    DLS lift knee unsupported 20x L/R5x L/R 20x L/R  20x L/R  20x 20x 20x 20x 20x    DLS lunge 5x L/R 5x 5x L/R 5 x  L/R 5x " "L/R 5x L/R  5x L/R 5x L/R 5x L/R    Ther Ex             K to C  LTR  PPT 5x L/R hold 5\"  5x  10x 5x L/R  5x  10x 5x  5x  10x 5x  5x  10 x   -   -       S/L clamshells 20x blue TB 20x blue TB 20x blue TB 20 x blue TB 20x blue TB 20x blue TB   20x blue TB    Stand lumbar ext 5x 5x -  5x -       Prone on elbows 3 min 3 min - * create lift- good body mechanics floor to waist  10x 5# 20x 5# with VC for good body mechanics  110x 20x 10# with good body mechanics floor to waist 20x 10# with good body mechanics floor to waist 20x 10# with good body mechanics floor to waist 20x 10# good body mechanics    Bird dogs 20x 20x 20x  20x 20x  x20 ea 20x    Seated LAQ with DF pumps/nerve tensions 2 x 10 reps DF 2 x 10 DF 2 x 10 DF L/R 2 x 10 DF L/R 2 x 10 DF L/R HEP       Press ups 10x 10x 10x 10 x  10x 10x  x10 10x                 Ther Activity             NUSTEP 10 min L5  Seat 11 10' L5 seat 11 10' L5 10' L5 10' L5 8' L5 5' L5 5' L5 -    walk TM 5 min  1 mph TM 1 mph  6 min TM 1 2  7 min mph TM 1 2  7 min mph TM 1  5mph  10' TM 12'  1 5 mph TM 12'  1 5 mph (0 28) TM 12'  1 5 mph (0 28) TM 15' 1 5 mph     Stand HT raises 10x/10x 10x/10x 10x/10x 10 x 10 10x/10x 10x/10x 10x/10x 10x/10x 10x/10x    squats 10x 10x 10x 10 x  10x 10x  10x 10x 10x    Step up/side Step up 5x L/R 5x L/R 10x L/R 6\" 10 x L/R 6\"  10x up/side L/R 6\" 10x up/side 6\" L/R  x10 up  Ea  6\" 10x all 6\"    Hip 3 ways 10x L/R Vectors GTB 10x L/R 10x L/R GTB 10x L/R GTB 10x L/R GTB 10x GTB   10x GTB    Sit to stand 10x 21\" 10x 21\" 10x  10 x  10x 10x 10x x10 10x    Modalities                                                   "

## 2023-07-03 ENCOUNTER — OFFICE VISIT (OUTPATIENT)
Age: 58
End: 2023-07-03
Payer: OTHER MISCELLANEOUS

## 2023-07-03 DIAGNOSIS — S39.012D BACK STRAIN, SUBSEQUENT ENCOUNTER: ICD-10-CM

## 2023-07-03 DIAGNOSIS — M54.41 ACUTE RIGHT-SIDED LOW BACK PAIN WITH RIGHT-SIDED SCIATICA: Primary | ICD-10-CM

## 2023-07-03 PROCEDURE — 97530 THERAPEUTIC ACTIVITIES: CPT | Performed by: PHYSICAL THERAPIST

## 2023-07-03 PROCEDURE — 97110 THERAPEUTIC EXERCISES: CPT | Performed by: PHYSICAL THERAPIST

## 2023-07-03 PROCEDURE — 97112 NEUROMUSCULAR REEDUCATION: CPT | Performed by: PHYSICAL THERAPIST

## 2023-07-03 NOTE — PROGRESS NOTES
Daily Note     Today's date: 7/3/2023  Patient name: Ar Gallardo  : 1965  MRN: [id number]  Referring provider: Jani Cartagena DO  Dx:   Encounter Diagnosis     ICD-10-CM    1. Acute right-sided low back pain with right-sided sciatica  M54.41       2. Back strain, subsequent encounter  S39.012D           Start Time: 3600  Stop Time: 1635  Total time in clinic (min): 50 minutes    Subjective: Pt states he has off of work this week, pain /10. Objective: See treatment diary below      Assessment: . Patient performed Treadmill aerobic exercise to increase blood flow to the area being treated, prepare the muscles for strength training and stretching, improve overall tolerance to activity, and aerobic endurance. 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 Continue B LE strengthening, DLS, endurance activity, and work simulation activity. Plan: Progress treatment as tolerated.        Precautions: 1 year history of LBP      Manuals 5/25 6/1 6/8 6/12 6/15 6/19 6/22 6/26 6/29 7/3                                          RE       RR      Neuro Re-Ed #11            Instruction in HEP             Instruction in posture and body mechanics                          DLS tbands 68K all blue TB 20x all blue TB 20x all blue TB 20x ea BLUE 20x all blue TB 20x all blue TB  20x all blue TB 20x all blue 20x all blue TB   Lateral press 20x all blue TB L/R 20x all blue TB 20x all blue TB 20x ea BLUE 20 x all blue TB 20x all blue TB  20x all blue TB 20x all blue TB 20x all blue TB   Hip hinge 5x 10" hold 5x  10" hold 5x hold 5" 5x 5" hold 5x hold 10" 5x hold 5"   5x hold 5" 5x hold 10"   Cat n camel 10x 10x 10x 10 x  10x 10x  10x 10x 10x   Quadruped reciprocal arm/leg lift 5x L/R 5x L/R 7x 8 x  10x 10x 10x 10x  10x 10x   Bridge with abd bracing 20x blue TB 20x blue TB 20x blue TB 20x TB Blue 20x Blue TB 20x blue TB   20x blue 20x blue   DLS lift knee unsupported 20x L/R5x L/R 20x L/R  20x L/R  20x 20x 20x 20x 20x 20x DLS lunge 5x L/R 5x 5x L/R 5 x  L/R 5x L/R 5x L/R  5x L/R 5x L/R 5x L/R Lunge walk, 2 x 20'   Ther Ex             K to C  LTR  PPT 5x L/R hold 5"  5x  10x 5x L/R  5x  10x 5x  5x  10x 5x  5x  10 x   -   -       S/L clamshells 20x blue TB 20x blue TB 20x blue TB 20 x blue TB 20x blue TB 20x blue TB   20x blue TB 20x L/R blue TB   Stand lumbar ext 5x 5x -  5x -       Prone on elbows 3 min 3 min - * create lift- good body mechanics floor to waist  10x 5# 20x 5# with VC for good body mechanics  110x 20x 10# with good body mechanics floor to waist 20x 10# with good body mechanics floor to waist 20x 10# with good body mechanics floor to waist 20x 10# good body mechanics Crate lift 15# 10x florr to chest   Bird dogs 20x 20x 20x  20x 20x  x20 ea 20x 20x   Seated LAQ with DF pumps/nerve tensions 2 x 10 reps DF 2 x 10 DF 2 x 10 DF L/R 2 x 10 DF L/R 2 x 10 DF L/R HEP       Press ups 10x 10x 10x 10 x  10x 10x  x10 10x 10                Ther Activity             NUSTEP 10 min L5  Seat 11 10' L5 seat 11 10' L5 10' L5 10' L5 8' L5 5' L5 5' L5 - -   walk TM 5 min  1 mph TM 1 mph  6 min TM 1.2  7 min mph TM 1.2  7 min mph TM 1. 5mph  10' TM 12'  1.5 mph TM 12'  1.5 mph (0.28) TM 12'  1.5 mph (0.28) TM 15' 1.5 mph  TM 15' 1.5mph   Stand HT raises 10x/10x 10x/10x 10x/10x 10 x 10 10x/10x 10x/10x 10x/10x 10x/10x 10x/10x 10x/10x   squats 10x 10x 10x 10 x  10x 10x  10x 10x 10x 10x   Step up/side Step up 5x L/R 5x L/R 10x L/R 6" 10 x L/R 6"  10x up/side L/R 6" 10x up/side 6" L/R  x10 up  Ea  6" 10x all 6" 20x up/side L/R   Hip 3 ways 10x L/R Vectors GTB 10x L/R 10x L/R GTB 10x L/R GTB 10x L/R GTB 10x GTB   10x GTB 10x L/R GTB   Sit to stand 10x 21" 10x 21" 10x  10 x  10x 10x 10x x10 10x 15x   Modalities

## 2023-07-05 ENCOUNTER — OFFICE VISIT (OUTPATIENT)
Age: 58
End: 2023-07-05
Payer: OTHER MISCELLANEOUS

## 2023-07-05 DIAGNOSIS — M54.41 ACUTE RIGHT-SIDED LOW BACK PAIN WITH RIGHT-SIDED SCIATICA: Primary | ICD-10-CM

## 2023-07-05 DIAGNOSIS — S39.012D BACK STRAIN, SUBSEQUENT ENCOUNTER: ICD-10-CM

## 2023-07-05 PROCEDURE — 97110 THERAPEUTIC EXERCISES: CPT | Performed by: PHYSICAL THERAPIST

## 2023-07-05 PROCEDURE — 97530 THERAPEUTIC ACTIVITIES: CPT | Performed by: PHYSICAL THERAPIST

## 2023-07-05 PROCEDURE — 97112 NEUROMUSCULAR REEDUCATION: CPT | Performed by: PHYSICAL THERAPIST

## 2023-07-05 NOTE — PROGRESS NOTES
Daily Note     Today's date: 2023  Patient name: Carlos A Quiroz  : 1965  MRN: [id number]  Referring provider: Karen Livingston DO  Dx:   Encounter Diagnosis     ICD-10-CM    1. Acute right-sided low back pain with right-sided sciatica  M54.41       2. Back strain, subsequent encounter  S39.012D           Start Time: 1100  Stop Time: 1150  Total time in clinic (min): 50 minutes    Subjective:  Pt states he was more sore after trial lunge walk last visit, does not want to do these any more. Pain range 3-4/10. Objective: See treatment diary below      Assessment: . Patient performed Treadmill aerobic exercise to increase blood flow to the area being treated, prepare the muscles for strength training and stretching, improve overall tolerance to activity, and aerobic endurance. Discussed transition to gym fitness program at the end of the month, patient verbalized understanding and agreement with the plan. Will discuss with his wife who has questions about transition to fitness program. Pt working full time without restriction. Continue with endurance, DLS, B LE strengthening program.  Plan to continue PT 2 weeks then taper to DC, transitioning to fitness program.      Plan: Progress treatment as tolerated.        Precautions: 1 year history of LBP      Manuals 7/5     6/19 6/22 6/26 6/29 7/3                                          RE       RR      Neuro Re-Ed             Instruction in HEP Discussed DC with patient, able to complete exercise program independently plan to taper to DC and transition to gym/fitness program            Instruction in posture and body mechanics                          DLS tbands 55N all black TB     20x all blue TB  20x all blue TB 20x all blue 20x all blue TB   Lateral press 20x L/R black TB     20x all blue TB  20x all blue TB 20x all blue TB 20x all blue TB   Hip hinge 5x hold 10"     5x hold 5"   5x hold 5" 5x hold 10"   Cat n camel 10x     10x  10x 10x 10x Quadruped reciprocal arm/leg lift 10x     10x 10x 10x  10x 10x   Bridge with abd bracing 20x black TB     20x blue TB   20x blue 20x blue   DLS lift knee unsupported 20x     20x 20x 20x 20x 20x   DLS lunge Uni lunge 5x L/R     5x L/R  5x L/R 5x L/R 5x L/R Lunge walk, 2 x 20'   Ther Ex             K to C  LTR  PPT        -       S/L clamshells 20x L/T black tband     20x blue TB   20x blue TB 20x L/R blue TB   Stand lumbar ext      -       Prone on elbows Crate lift  15#  10x floor to chest     20x 10# with good body mechanics floor to waist 20x 10# with good body mechanics floor to waist 20x 10# with good body mechanics floor to waist 20x 10# good body mechanics Crate lift 15# 10x floor to chest   Bird dogs 20x     20x  x20 ea 20x 20x   Seated LAQ with DF pumps/nerve tensions 2 x L/R 10x ankle pumps     HEP       Press ups 10x     10x  x10 10x 10                Ther Activity             NUSTEP      8' L5 5' L5 5' L5 - -   walk TM 15' 1.5mph  With abdomenal bracing     TM 12'  1.5 mph TM 12'  1.5 mph (0.28) TM 12'  1.5 mph (0.28) TM 15' 1.5 mph  TM 15' 1.5mph   Stand HT raises 10x/10x     10x/10x 10x/10x 10x/10x 10x/10x 10x/10x   squats 10x     10x  10x 10x 10x 10x   Step up/side 20x L/R up/side     10x up/side 6" L/R  x10 up  Ea  6" 10x all 6" 20x up/side L/R   Hip 3 ways 2 x 10 GTB L/R     10x GTB   10x GTB 10x L/R GTB   Sit to stand 15x     10x 10x x10 10x 15x   Modalities

## 2023-07-10 ENCOUNTER — OFFICE VISIT (OUTPATIENT)
Age: 58
End: 2023-07-10
Payer: OTHER MISCELLANEOUS

## 2023-07-10 DIAGNOSIS — S39.012D BACK STRAIN, SUBSEQUENT ENCOUNTER: ICD-10-CM

## 2023-07-10 DIAGNOSIS — M54.41 ACUTE RIGHT-SIDED LOW BACK PAIN WITH RIGHT-SIDED SCIATICA: Primary | ICD-10-CM

## 2023-07-10 PROCEDURE — 97530 THERAPEUTIC ACTIVITIES: CPT

## 2023-07-10 PROCEDURE — 97110 THERAPEUTIC EXERCISES: CPT

## 2023-07-10 PROCEDURE — 97112 NEUROMUSCULAR REEDUCATION: CPT

## 2023-07-10 NOTE — PROGRESS NOTES
Daily Note     Today's date: 7/10/2023  Patient name: Alexa Herzog  : 1965  MRN: [id number]  Referring provider: Matt Elias DO  Dx:   Encounter Diagnosis     ICD-10-CM    1. Acute right-sided low back pain with right-sided sciatica  M54.41       2. Back strain, subsequent encounter  S39.012D                      Subjective: Pt reports he is doing well, feels like he is making progress. Objective: See treatment diary below      Assessment: Tolerated treatment well. Pt performed all exercises well, no issues noted. Continue to progress to tolerance. Pt had good form throughout session, demonstrated a strong understanding of exercises, continue to work towards independent exercises program. Patient demonstrated fatigue post treatment, exhibited good technique with therapeutic exercises and would benefit from continued PT      Plan: Continue per plan of care.       Precautions: 1 year history of LBP      Manuals 7/5 7/10    6/19 6/22 6/26 6/29 7/3                                          RE       RR      Neuro Re-Ed             Instruction in HEP Discussed DC with patient, able to complete exercise program independently plan to taper to DC and transition to gym/fitness program            Instruction in posture and body mechanics                          DLS tbands 48R all black TB 20x all black TB    20x all blue TB  20x all blue TB 20x all blue 20x all blue TB   Lateral press 20x L/R black TB 20x L/R black TB    20x all blue TB  20x all blue TB 20x all blue TB 20x all blue TB   Hip hinge 5x hold 10" 5x hold 10"    5x hold 5"   5x hold 5" 5x hold 10"   Cat n camel 10x 10x    10x  10x 10x 10x   Quadruped reciprocal arm/leg lift 10x 10x    10x 10x 10x  10x 10x   Bridge with abd bracing 20x black TB 20x black TB    20x blue TB   20x blue 20x blue   DLS lift knee unsupported 20x 20x    20x 20x 20x 20x 20x   DLS lunge Uni lunge 5x L/R Uni lunge 5x L/R    5x L/R  5x L/R 5x L/R 5x L/R Lunge walk, 2 x 20' Ther Ex             K to C  LTR  PPT        -       S/L clamshells 20x L/R black tband 20x L/R black tband    20x blue TB   20x blue TB 20x L/R blue TB   Stand lumbar ext      -       Prone on elbows Crate lift  15#  10x floor to chest Crate lift 15# 10x floor to chest    20x 10# with good body mechanics floor to waist 20x 10# with good body mechanics floor to waist 20x 10# with good body mechanics floor to waist 20x 10# good body mechanics Crate lift 15# 10x floor to chest   Bird dogs 20x 20x    20x  x20 ea 20x 20x   Seated LAQ with DF pumps/nerve tensions 2 x L/R 10x ankle pumps 2x10 L/R ankle pumps    HEP       Press ups 10x 10x    10x  x10 10x 10                Ther Activity             NUSTEP      8' L5 5' L5 5' L5 - -   walk TM 15' 1.5mph  With abdomenal bracing TM 15' 1.5 with abdomenal bracing    TM 12'  1.5 mph TM 12'  1.5 mph (0.28) TM 12'  1.5 mph (0.28) TM 15' 1.5 mph  TM 15' 1.5mph   Stand HT raises 10x/10x 20x/20x    10x/10x 10x/10x 10x/10x 10x/10x 10x/10x   squats 10x 10x    10x  10x 10x 10x 10x   Step up/side 20x L/R up/side 20x L/R up/side    10x up/side 6" L/R  x10 up  Ea  6" 10x all 6" 20x up/side L/R   Hip 3 ways 2 x 10 GTB L/R 2x10 GTB L/R    10x GTB   10x GTB 10x L/R GTB   Sit to stand 15x 15x    10x 10x x10 10x 15x   Modalities

## 2023-07-11 NOTE — PROGRESS NOTES
Daily Note     Today's date: 2023  Patient name: Cris Castillo  : 1965  MRN: [id number]  Referring provider: Deborah Bhakta DO  Dx:   Encounter Diagnosis     ICD-10-CM    1. Acute right-sided low back pain with right-sided sciatica  M54.41       2. Back strain, subsequent encounter  S39.012D           Start Time: 1645  Stop Time: 1730  Total time in clinic (min): 45 minutes    Subjective: Minimal pain today 2/10, no work today"I had the day off."      Objective: See treatment diary below      Assessment:   Patient performed Treadmill to increase blood flow to the area being treated, prepare the muscles for strength training and stretching, improve overall tolerance to activity, and aerobic endurance. Progressing with B LE strengthening, endurance activity, and lumbar flexibility/stabilization exercise. Continue PT with goal of increasing activity level with minimal pain. Plan: Continue per plan of care. Progress treatment as tolerated.        Precautions: 1 year history of LBP      Manuals 7/5 7/10 2023                                                 RE             Neuro Re-Ed             Instruction in HEP Discussed DC with patient, able to complete exercise program independently plan to taper to DC and transition to gym/fitness program            Instruction in posture and body mechanics                          DLS tbands 17K all black TB 20x all black TB 20x all black          Lateral press 20x L/R black TB 20x L/R black TB 20x all black TB          Hip hinge 5x hold 10" 5x hold 10" 5x hold 10"          Cat n camel 10x 10x 10x          Quadruped reciprocal arm/leg lift 10x 10x 10x          Bridge with abd bracing 20x black TB 20x black TB 20x black TB          DLS lift knee unsupported 20x 20x 20x          DLS lunge Uni lunge 5x L/R Uni lunge 5x L/R 5x L/R          Ther Ex             K to C  LTR  PPT   Press-ups 10x last          S/L clamshells 20x L/R black tband 20x L/R black tband 20x L/R black TB          Stand lumbar ext             Crate lift Crate lift  15#  10x floor to chest Crate lift 15# 10x floor to chest 10x 15#          Bird dogs 20x 20x 20x          Seated LAQ with DF pumps/nerve tensions 2 x L/R 10x ankle pumps 2x10 L/R ankle pumps 2 x 10 L/R          Press ups 10x 10x 10x                       Ther Activity             NUSTEP             walk TM 15' 1.5mph  With abdomenal bracing TM 15' 1.5 with abdomenal bracing 15' 1.5mph          Stand HT raises 10x/10x 20x/20x 20x/20x          squats 10x 10x 15x          Step up/side 20x L/R up/side 20x L/R up/side 20x L/R up/side 6"          Hip 3 ways 2 x 10 GTB L/R 2x10 GTB L/R 2 x 10 L/R GTB          Sit to stand 15x 15x 15x          Modalities

## 2023-07-12 ENCOUNTER — OFFICE VISIT (OUTPATIENT)
Age: 58
End: 2023-07-12
Payer: OTHER MISCELLANEOUS

## 2023-07-12 DIAGNOSIS — S39.012D BACK STRAIN, SUBSEQUENT ENCOUNTER: ICD-10-CM

## 2023-07-12 DIAGNOSIS — M54.41 ACUTE RIGHT-SIDED LOW BACK PAIN WITH RIGHT-SIDED SCIATICA: Primary | ICD-10-CM

## 2023-07-12 PROCEDURE — 97112 NEUROMUSCULAR REEDUCATION: CPT | Performed by: PHYSICAL THERAPIST

## 2023-07-12 PROCEDURE — 97530 THERAPEUTIC ACTIVITIES: CPT | Performed by: PHYSICAL THERAPIST

## 2023-07-12 PROCEDURE — 97110 THERAPEUTIC EXERCISES: CPT | Performed by: PHYSICAL THERAPIST

## 2023-07-13 NOTE — PROGRESS NOTES
Daily Note     Today's date: 2023  Patient name: Bunny Murphy  : 1965  MRN: [id number]  Referring provider: Sherie Carmona DO  Dx:   Encounter Diagnosis     ICD-10-CM    1. Acute right-sided low back pain with right-sided sciatica  M54.41       2. Back strain, subsequent encounter  S39.012D           Start Time: 1600  Stop Time: 1650  Total time in clinic (min): 50 minutes    Subjective: Pain 3/10 today. Objective: See treatment diary below      Assessment:. Patient performed Treadmill aerobic exercise to increase blood flow to the area being treated, prepare the muscles for strength training and stretching, improve overall tolerance to activity, and aerobic endurance. Progressing with lumbar stabilization, flexibility, and LE strengthening ex, work simulation and endurance activity. Tolerated all exercise well without difficulty. Plan: Progress treatment as tolerated.        Precautions: 1 year history of LBP      Manuals 7/5 7/10 2023 7/17                                                RE             Neuro Re-Ed             Instruction in HEP Discussed DC with patient, able to complete exercise program independently plan to taper to DC and transition to gym/fitness program            Instruction in posture and body mechanics                          DLS tbands 45F all black TB 20x all black TB 20x all black 20x all black TB         Lateral press 20x L/R black TB 20x L/R black TB 20x all black TB 20x all balck TB         Hip hinge 5x hold 10" 5x hold 10" 5x hold 10" 5x 5"         Cat n camel 10x 10x 10x 10x         Quadruped reciprocal arm/leg lift 10x 10x 10x 15x         Bridge with abd bracing 20x black TB 20x black TB 20x black TB 20x black         DLS lift knee unsupported 20x 20x 20x 20x         DLS lunge Uni lunge 5x L/R Uni lunge 5x L/R 5x L/R 5x L/R         Ther Ex             K to C  LTR  PPT   Press-ups 10x last 10xlast         S/L clamshells 20x L/R black tband 20x L/R black tband 20x L/R black TB 20x black TB         Stand lumbar ext    20x/20x blcak TB L/R         Crate lift Crate lift  15#  10x floor to chest Crate lift 15# 10x floor to chest 10x 15# 20x 15#         Bird dogs 20x 20x 20x 20x         Seated LAQ with DF pumps/nerve tensions 2 x L/R 10x ankle pumps 2x10 L/R ankle pumps 2 x 10 L/R -         Press ups 10x 10x 10x 10x                      Ther Activity             NUSTEP             walk TM 15' 1.5mph  With abdomenal bracing TM 15' 1.5 with abdomenal bracing 15' 1.5mph 15' 1.5mph         Stand HT raises 10x/10x 20x/20x 20x/20x 20x/20x         squats 10x 10x 15x 20x         Step up/side 20x L/R up/side 20x L/R up/side 20x L/R up/side 6" 20x L/R up/side 6"         Hip 3 ways 2 x 10 GTB L/R 2x10 GTB L/R 2 x 10 L/R GTB 20x L/R         Sit to stand 15x 15x 15x 20x         Modalities

## 2023-07-17 ENCOUNTER — OFFICE VISIT (OUTPATIENT)
Age: 58
End: 2023-07-17
Payer: OTHER MISCELLANEOUS

## 2023-07-17 DIAGNOSIS — S39.012D BACK STRAIN, SUBSEQUENT ENCOUNTER: ICD-10-CM

## 2023-07-17 DIAGNOSIS — M54.41 ACUTE RIGHT-SIDED LOW BACK PAIN WITH RIGHT-SIDED SCIATICA: Primary | ICD-10-CM

## 2023-07-17 PROCEDURE — 97110 THERAPEUTIC EXERCISES: CPT | Performed by: PHYSICAL THERAPIST

## 2023-07-17 PROCEDURE — 97530 THERAPEUTIC ACTIVITIES: CPT | Performed by: PHYSICAL THERAPIST

## 2023-07-17 PROCEDURE — 97112 NEUROMUSCULAR REEDUCATION: CPT | Performed by: PHYSICAL THERAPIST

## 2023-07-19 NOTE — PROGRESS NOTES
Daily Note     Today's date: 2023  Patient name: Mey Jolley  : 1965  MRN: [id number]  Referring provider: Miriam Butt DO  Dx:   Encounter Diagnosis     ICD-10-CM    1. Acute right-sided low back pain with right-sided sciatica  M54.41       2. Back strain, subsequent encounter  S39.012D           Start Time: 1600  Stop Time: 5371  Total time in clinic (min): 50 minutes    Subjective: Pt states more work yesterday, and today 4/10 range. Di not work today. Objective: See treatment diary below      Assessment: . Patient performed Treadmill aerobic exercise to increase blood flow to the area being treated, prepare the muscles for strength training and stretching, improve overall tolerance to activity, and aerobic endurance. walking 15 min with minimal discomfort. Progressing with lumbar stabilization and B LE strengthening program. Tolerating all exercise with minimal discomfort. Lifting 15# without difficulty or pain. Contnnue PT with goal of increasing activity level with minimal pain. Plan: Progress treatment as tolerated.        Precautions: 1 year history of LBP      Manuals 7/5 7/10 2023 7/17 7/20                                               RE             Neuro Re-Ed             Instruction in HEP Discussed DC with patient, able to complete exercise program independently plan to taper to DC and transition to gym/fitness program            Instruction in posture and body mechanics                          DLS tbands 71K all black TB 20x all black TB 20x all black 20x all black TB 20x all black TB        Lateral press 20x L/R black TB 20x L/R black TB 20x all black TB 20x all balck TB 20x all black TB        Hip hinge 5x hold 10" 5x hold 10" 5x hold 10" 5x 5" 5 x 5"        Cat n camel 10x 10x 10x 10x 10x        Quadruped reciprocal arm/leg lift 10x 10x 10x 15x 15x        Bridge with abd bracing 20x black TB 20x black TB 20x black TB 20x black 20x black TB        DLS lift knee unsupported 20x 20x 20x 20x 20x        DLS lunge Uni lunge 5x L/R Uni lunge 5x L/R 5x L/R 5x L/R 5x L/R        Ther Ex             K to C  LTR  PPT   Press-ups 10x last 10xlast 10x last        S/L clamshells 20x L/R black tband 20x L/R black tband 20x L/R black TB 20x black TB 20x black TB        Stand lumbar ext    20x/20x blcak TB L/R         Crate lift Crate lift  15#  10x floor to chest Crate lift 15# 10x floor to chest 10x 15# 20x 15# 20x 15#        Bird dogs 20x 20x 20x 20x 20x        Seated LAQ with DF pumps/nerve tensions 2 x L/R 10x ankle pumps 2x10 L/R ankle pumps 2 x 10 L/R -         Press ups 10x 10x 10x 10x 10x                     Ther Activity             NUSTEP             walk TM 15' 1.5mph  With abdomenal bracing TM 15' 1.5 with abdomenal bracing 15' 1.5mph 15' 1.5mph 15' 1.5mph        Stand HT raises 10x/10x 20x/20x 20x/20x 20x/20x 20x/20x        squats 10x 10x 15x 20x 20x        Step up/side 20x L/R up/side 20x L/R up/side 20x L/R up/side 6" 20x L/R up/side 6" 20x L/R up/side 6"        Hip 3 ways 2 x 10 GTB L/R 2x10 GTB L/R 2 x 10 L/R GTB 20x L/R 20x GTB L/R        Sit to stand 15x 15x 15x 20x 20x        Modalities

## 2023-07-20 ENCOUNTER — OFFICE VISIT (OUTPATIENT)
Age: 58
End: 2023-07-20
Payer: OTHER MISCELLANEOUS

## 2023-07-20 DIAGNOSIS — S39.012D BACK STRAIN, SUBSEQUENT ENCOUNTER: ICD-10-CM

## 2023-07-20 DIAGNOSIS — M54.41 ACUTE RIGHT-SIDED LOW BACK PAIN WITH RIGHT-SIDED SCIATICA: Primary | ICD-10-CM

## 2023-07-20 PROCEDURE — 97530 THERAPEUTIC ACTIVITIES: CPT | Performed by: PHYSICAL THERAPIST

## 2023-07-20 PROCEDURE — 97110 THERAPEUTIC EXERCISES: CPT | Performed by: PHYSICAL THERAPIST

## 2023-07-20 PROCEDURE — 97112 NEUROMUSCULAR REEDUCATION: CPT | Performed by: PHYSICAL THERAPIST

## 2023-07-24 ENCOUNTER — OFFICE VISIT (OUTPATIENT)
Age: 58
End: 2023-07-24
Payer: OTHER MISCELLANEOUS

## 2023-07-24 DIAGNOSIS — S39.012D BACK STRAIN, SUBSEQUENT ENCOUNTER: Primary | ICD-10-CM

## 2023-07-24 DIAGNOSIS — M54.41 ACUTE RIGHT-SIDED LOW BACK PAIN WITH RIGHT-SIDED SCIATICA: ICD-10-CM

## 2023-07-24 PROCEDURE — 97530 THERAPEUTIC ACTIVITIES: CPT | Performed by: PHYSICAL THERAPIST

## 2023-07-24 PROCEDURE — 97112 NEUROMUSCULAR REEDUCATION: CPT | Performed by: PHYSICAL THERAPIST

## 2023-07-24 PROCEDURE — 97110 THERAPEUTIC EXERCISES: CPT | Performed by: PHYSICAL THERAPIST

## 2023-07-24 NOTE — PROGRESS NOTES
Daily Note     Today's date: 2023  Patient name: Giorgi Hernandez  : 1965  MRN: [id number]  Referring provider: Melodie Butcher DO  Dx:   Encounter Diagnosis     ICD-10-CM    1. Back strain, subsequent encounter  S39.012D       2. Acute right-sided low back pain with right-sided sciatica  M54.41           Start Time: 1600  Stop Time: 1650  Total time in clinic (min): 50 minutes    Subjective: pain 3/10 today, was able to walk through grocery store without increase in pain. Objective: See treatment diary below      Assessment: . Patient performed Treadmill aerobic exercise to increase blood flow to the area being treated, prepare the muscles for strength training and stretching, improve overall tolerance to activity, and aerobic endurance. Progressing with B LE strengthening, endurance activity, and lumbar stabilization ex, and work simulation activity. Continue PT 1 more visit then discontinue formal PT and continue with HEP. Plan: Progress treatment as tolerated.        Precautions: 1 year history of LBP      Manuals 7/5 7/10 2023 7/17 7/20 7/24                                              RE             Neuro Re-Ed             Instruction in HEP Discussed DC with patient, able to complete exercise program independently plan to taper to DC and transition to gym/fitness program            Instruction in posture and body mechanics                          DLS tbands 44E all black TB 20x all black TB 20x all black 20x all black TB 20x all black TB 20x all black TB       Lateral press 20x L/R black TB 20x L/R black TB 20x all black TB 20x all balck TB 20x all black TB 20x/20x black TB       Hip hinge 5x hold 10" 5x hold 10" 5x hold 10" 5x 5" 5 x 5" 5 x 5"       Cat n camel 10x 10x 10x 10x 10x 10x       Quadruped reciprocal arm/leg lift 10x 10x 10x 15x 15x 15x       Bridge with abd bracing 20x black TB 20x black TB 20x black TB 20x black 20x black TB 20x black TB       DLS lift knee unsupported 20x 20x 20x 20x 20x 20x L/R       DLS lunge Uni lunge 5x L/R Uni lunge 5x L/R 5x L/R 5x L/R 5x L/R 5x L/R       Ther Ex             K to C  LTR  PPT   Press-ups 10x last 10xlast 10x last press up 10x last       S/L clamshells 20x L/R black tband 20x L/R black tband 20x L/R black TB 20x black TB 20x black TB 20x L/R black TB       Stand lumbar ext    20x/20x blcak TB L/R         Crate lift Crate lift  15#  10x floor to chest Crate lift 15# 10x floor to chest 10x 15# 20x 15# 20x 15# 20x 15#       Bird dogs 20x 20x 20x 20x 20x 20x       Seated LAQ with DF pumps/nerve tensions 2 x L/R 10x ankle pumps 2x10 L/R ankle pumps 2 x 10 L/R -         Press ups 10x 10x 10x 10x 10x 10x                    Ther Activity             NUSTEP             walk TM 15' 1.5mph  With abdomenal bracing TM 15' 1.5 with abdomenal bracing 15' 1.5mph 15' 1.5mph 15' 1.5mph 15' 1.5mph       Stand HT raises 10x/10x 20x/20x 20x/20x 20x/20x 20x/20x 20x/20x       squats 10x 10x 15x 20x 20x 20x       Step up/side 20x L/R up/side 20x L/R up/side 20x L/R up/side 6" 20x L/R up/side 6" 20x L/R up/side 6" 20x L/R up/side       Hip 3 ways 2 x 10 GTB L/R 2x10 GTB L/R 2 x 10 L/R GTB 20x L/R 20x GTB L/R 20x L/R GTB       Sit to stand 15x 15x 15x 20x 20x 20x       Modalities

## 2023-07-26 NOTE — PROGRESS NOTES
PT Discharge    Today's date: 2023  Patient name: Rox Dubose  : 1965  MRN: [id number]  Referring provider: Priyanka Ruiz DO  Dx:   Encounter Diagnosis     ICD-10-CM    1. Acute right-sided low back pain with right-sided sciatica  M54.41       2. Back strain, subsequent encounter  S39.012D           Start Time: 1600  Stop Time: 6724  Total time in clinic (min): 50 minutes    Assessment  Assessment details: The patient is a 59year old man 1 year post onset of this bout of LBP. Pain range 2-4/10. Periodic radiating pain to posterior R lower mid thigh. Trunk ROM minimally limited in all planes. Pt I all self care, working at [employer] without restriction, walking up to 30 minutes, and has resumed home chores such as yard work. Trunk strength : Flx/5/5, ext 4/5. LE strength is WFL. B squat 75%, HT walk intact. Oswestry 10% disability. Plan to discontinue formal PT and continue with HEP. Pt has joined a gym and plans to continue HEP. 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 I HEP (MET)  2. Instruct patient in proper posture and body mechanics(MET)  3. Improve patients self awareness of posture and body mechanics (progressing)  4. Centralize R LE symptoms (progressing)  5. Decrease pain range to 0-5/10 (progressing)    STG- 4 weeks 23-   1. Centralize R LE symptoms (progressing)  2. Decrease pain range to 2-4/10 (progressing)  3.  Up/down steps reciprocally (MET)  4. Walk 20-30 minutes consecutively (progressing)        LTG- 12 weeks 7/10/23  1. Decrease pain range to 0-2 (progressing)  2. Resolve radiating radicular symptoms (progressing)  3. Improve B hip strength to 4+-5/5 (progressing)  4. Improve trunk ROM to WNL (MET)  5. Improve Oswestry score 15-20 points from IE (MET)    Plan  Patient would benefit from: skilled physical therapy and PT eval  Referral necessary: No  Planned modality interventions: cryotherapy and thermotherapy: hydrocollator packs  Planned therapy interventions: manual therapy, joint mobilization, neuromuscular re-education, postural training, patient education, therapeutic activities, therapeutic exercise and home exercise program  Frequency: 2x week  Treatment plan discussed with: patient        Subjective Evaluation    History of Present Illness  Date of onset: 3/22/2023  Mechanism of injury: 7/27/23-  Pain range 2-4/10. Pt has joined [employer] [facility] [employer] to continue with HEP. Working without restriction, has resumed home chores like mowing and yardwork. RE: 6/22/23- Pt reports he has 4/10 pain today. Constantin Giovanateodora saw his pain management Doctor and it was determined they will be performing injections in the low back on 7/5/23. Pt reports that he is not limited with gardening contained to small pots. Notes he does not lift soil bags but does place his pots at waist height. Pt is still able to walk 15 minutes before requiring rest breaks. Notes he has been working on incorporating more walking into his week. Pt reports he still gets pain going up down the steps but notes it is less painful than before. Pt is using reciprocal pattern. Pt is still seeing the chiropractor. RE: 5/15/23- Pt states he is less painful. Able to do small amounts of gardening. Pt able to walk 15 min. States radiating LE pain is less intense. Pain with up and down steps. Pt states he received a chiropractic treatment yesterday. IE: Pt was at work 1 year ago and was lifting a box and twisted and felt a pop.   Pt works at [employer]. Pt seen in ER. Pt received lidocaine patch and muscle relaxant at that time. Pt received PT for 3 weeks with some pain relief. Pt currently working without restriction. Pt has had another round of PT for 6 months, and was DC from that facility. Pt received epidural injection 2 weeks ago and had 3 days of pain relief. Pt currently in litigation against [employer] for a [employer] claim. Pt referred for outpatient PT. Imaging revealed Lumbar disc bulge L 1-2, L 2-3, and HNPL 3-4, L5-S1, R S1 radiculopathy per EMG. Recurrent probem    Quality of life: fair    Patient Goals  Patient goals for therapy: decreased pain  Patient goal: decreased pain (in progress)  Pain  Current pain rating: 3 (low back > R glute > 1/2 way into hamstring)  At best pain ratin  At worst pain ratin  Quality: sharp (electricity like)  Relieving factors: ice, rest and medications (ms relaxers prn)  Progression: improved    Social Support  Steps to enter house: yes  3  Stairs in house: yes   13  Lives in: multiple-level home  Lives with: spouse    Employment status: working  Hand dominance: right      Diagnostic Tests  X-ray: abnormal  MRI studies: abnormal  Treatments  Previous treatment: physical therapy  Current treatment: physical therapy        Objective     Concurrent Complaints  Negative for night pain, disturbed sleep, bladder dysfunction, bowel dysfunction, saddle (S4) numbness, cardiac problem, kidney problem, gallbladder problem, stomach problem, ulcer, appendix problem, spleen problem, pancreas problem, history of cancer, history of trauma and infection    Postural Observations  Seated posture: fair  Standing posture: fair    Additional Postural Observation Details  Decreased lumbar lordosis, forward head and shoulders    Tenderness     Lumbar Spine  No tenderness in the spinous process. Right Hip   Tenderness in the PSIS.      Additional Tenderness Details  R PSIS     Neurological Testing Sensation     Lumbar   Left   Intact: light touch    Right   Intact: light touch  Paresthesia: light touch    Additional Neurological Details  Radiating pain from R buttock to mid R hamstring, intermittently    Active Range of Motion     Lumbar   Flexion:  Restriction level: minimal  Extension:  Restriction level: minimal  Left lateral flexion:  Restriction level: minimal  Right lateral flexion:  Restriction level: minimal  Left rotation:  WFL  Right rotation:  [facility]    Additional Active Range of Motion Details  Fingertip to floor: For Flx/40cm, SB L/57cm, SB/R/54cm    5/15/23- fingertip to floor: For Flx/ 38cm   SB L/54cm, SB R/53.5cm,     6/22/23- fingertip to floor: For Flx/ 30cm   SB L/54cm, SB R 54cm    7/27/23- fingertip to floor: For Flx/28cm, lateral flx L/48cm, lateral flx R/52cm  Mechanical Assessment    Cervical      Thoracic    Lying extension:   Pain location: centralized  Pain intensity: better  Pain level: decreased    Lumbar    Standing extension: repeated movements  Pain location: centralized    Strength/Myotome Testing     Left Hip   Planes of Motion   Flexion: 5  Abduction: 5    Right Hip   Planes of Motion   Flexion: 5  Abduction: 5    Additional Strength Details  Trunk flx 3/5, ext 4/5  5/15- trunk flx 4/5, ext 4/5    6/22- trunk flx 4/5, ext 4/5    7/27- trunk flex 5/5, ext 4/5    Tests     Lumbar     Left   Negative passive SLR. Right   Negative passive SLR.      Additional Tests Details  Pain decreased with B zahra traction    4/17/23- TUG/18", 5x STS/35"    5/15/23- TUG/15", 5x STS/33"    6/22/23- TUG/13", 5x STS/31"    7/27/23- TUG/ 12"     5xSTS/20"    Ambulation   Weight-Bearing Status   Weight-Bearing Status (Left): full weight bearing   Weight-Bearing Status (Right): full weight-bearing    Assistive device used: none    Ambulation: Stairs   Ascend stairs: independent  Pattern: reciprocal  Railings: one rail  Descend stairs: independent  Pattern: reciprocal  Railings: one rail    General Comments:      Lumbar Comments  4/17/23- Oswestry 32%    5/15/23- Modified Oswestry 16%    6/22/23- Oswestry 10%    7/27/23- Oswestry 10% disability    Hip Comments   R hip IR ROM minimally  limited        Knee Comments  B squat 75%, unable to rise to stand without use of hands from 18" chair, no hands from 24" chair         Ankle/Foot Comments   HT walk intact             Precautions: 1 year history of LBP      Manuals 7/5 7/10 7/12/2023 7/17 7/20 7/24 7/27                                             RE       FOTO/RE/DC      Neuro Re-Ed             Instruction in HEP Discussed DC with patient, able to complete exercise program independently plan to taper to DC and transition to gym/fitness program            Instruction in posture and body mechanics                          DLS tbands 93A all black TB 20x all black TB 20x all black 20x all black TB 20x all black TB 20x all black TB 20x black TB all      Lateral press 20x L/R black TB 20x L/R black TB 20x all black TB 20x all balck TB 20x all black TB 20x/20x black TB 20x/20x black TB      Hip hinge 5x hold 10" 5x hold 10" 5x hold 10" 5x 5" 5 x 5" 5 x 5" 5x hold 5"      Cat n camel 10x 10x 10x 10x 10x 10x 10x      Quadruped reciprocal arm/leg lift 10x 10x 10x 15x 15x 15x 15x      Bridge with abd bracing 20x black TB 20x black TB 20x black TB 20x black 20x black TB 20x black TB 20x black TB      DLS lift knee unsupported 20x 20x 20x 20x 20x 20x L/R 20x L/R      DLS lunge Uni lunge 5x L/R Uni lunge 5x L/R 5x L/R 5x L/R 5x L/R 5x L/R 5x L/R      Ther Ex             K to C  LTR  PPT   Press-ups 10x last 10xlast 10x last press up 10x last 10x      S/L clamshells 20x L/R black tband 20x L/R black tband 20x L/R black TB 20x black TB 20x black TB 20x L/R black TB 20x L/R black TB      Stand lumbar ext    20x/20x blcak TB L/R         Crate lift Crate lift  15#  10x floor to chest Crate lift 15# 10x floor to chest 10x 15# 20x 15# 20x 15# 20x 15# 20x 15# Bird dogs 20x 20x 20x 20x 20x 20x 20x      Seated LAQ with DF pumps/nerve tensions 2 x L/R 10x ankle pumps 2x10 L/R ankle pumps 2 x 10 L/R -   2 x 10 reps DF      Press ups 10x 10x 10x 10x 10x 10x 10x                   Ther Activity             NUSTEP             walk TM 15' 1.5mph  With abdomenal bracing TM 15' 1.5 with abdomenal bracing 15' 1.5mph 15' 1.5mph 15' 1.5mph 15' 1.5mph 15' 1.5mph      Stand HT raises 10x/10x 20x/20x 20x/20x 20x/20x 20x/20x 20x/20x 20x/20x      squats 10x 10x 15x 20x 20x 20x 20x      Step up/side 20x L/R up/side 20x L/R up/side 20x L/R up/side 6" 20x L/R up/side 6" 20x L/R up/side 6" 20x L/R up/side 20x L/R      Hip 3 ways 2 x 10 GTB L/R 2x10 GTB L/R 2 x 10 L/R GTB 20x L/R 20x GTB L/R 20x L/R GTB 20x L/R GTB      Sit to stand 15x 15x 15x 20x 20x 20x 20x      Modalities

## 2023-07-27 ENCOUNTER — EVALUATION (OUTPATIENT)
Age: 58
End: 2023-07-27
Payer: OTHER MISCELLANEOUS

## 2023-07-27 DIAGNOSIS — S39.012D BACK STRAIN, SUBSEQUENT ENCOUNTER: ICD-10-CM

## 2023-07-27 DIAGNOSIS — M54.41 ACUTE RIGHT-SIDED LOW BACK PAIN WITH RIGHT-SIDED SCIATICA: Primary | ICD-10-CM

## 2023-07-27 PROCEDURE — 97110 THERAPEUTIC EXERCISES: CPT | Performed by: PHYSICAL THERAPIST

## 2023-07-27 PROCEDURE — 97112 NEUROMUSCULAR REEDUCATION: CPT | Performed by: PHYSICAL THERAPIST

## 2023-07-27 PROCEDURE — 97530 THERAPEUTIC ACTIVITIES: CPT | Performed by: PHYSICAL THERAPIST

## 2023-07-27 NOTE — LETTER
2023    Sofy Joseph DO  [address] [place] [address]    Patient: Claire Ontiveros   YOB: 1965   Date of Visit: 2023     Encounter Diagnosis     ICD-10-CM    1. Acute right-sided low back pain with right-sided sciatica  M54.41       2. Back strain, subsequent encounter  S39.012D           Dear Dr. Madonna Cifuentes: Thank you for your recent referral of Claire Ontiveros. Please review the attached evaluation summary from Amauri's recent visit. Please verify that you agree with the plan of care by signing the attached order. If you have any questions or concerns, please do not hesitate to call. I sincerely appreciate the opportunity to share in the care of one of your patients and hope to have another opportunity to work with you in the near future. Sincerely,    Gertrude Cedillo, PT      Referring Provider:      I certify that I have read the below Plan of Care and certify the need for these services furnished under this plan of treatment while under my care. Sofy Joseph DO  [address] [place] [address]  Via Fax: [phone]          PT Discharge    Today's date: 2023  Patient name: Claire Ontiveros  : 1965  MRN: [id number]  Referring provider: Sofy Joseph DO  Dx:   Encounter Diagnosis     ICD-10-CM    1. Acute right-sided low back pain with right-sided sciatica  M54.41       2. Back strain, subsequent encounter  S39.012D           Start Time: 1600  Stop Time: 4281  Total time in clinic (min): 50 minutes    Assessment  Assessment details: The patient is a 59year old man 1 year post onset of this bout of LBP. Pain range 2-4/10. Periodic radiating pain to posterior R lower mid thigh. Trunk ROM minimally limited in all planes. Pt I all self care, working at [employer] without restriction, walking up to 30 minutes, and has resumed home chores such as yard work.  Trunk strength : Flx/5/5, ext 4/5. LE strength is WFL. B squat 75%, HT walk intact. Oswestry 10% disability. Plan to discontinue formal PT and continue with HEP. Pt has joined a gym and plans to continue HEP. 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 I HEP (MET)  2. Instruct patient in proper posture and body mechanics(MET)  3. Improve patients self awareness of posture and body mechanics (progressing)  4. Centralize R LE symptoms (progressing)  5. Decrease pain range to 0-5/10 (progressing)    STG- 4 weeks 6/12/23-   1. Centralize R LE symptoms (progressing)  2. Decrease pain range to 2-4/10 (progressing)  3. Up/down steps reciprocally (MET)  4. Walk 20-30 minutes consecutively (progressing)        LTG- 12 weeks 7/10/23  1. Decrease pain range to 0-2 (progressing)  2. Resolve radiating radicular symptoms (progressing)  3. Improve B hip strength to 4+-5/5 (progressing)  4. Improve trunk ROM to WNL (MET)  5.  Improve Oswestry score 15-20 points from IE (MET)    Plan  Patient would benefit from: skilled physical therapy and PT eval  Referral necessary: No  Planned modality interventions: cryotherapy and thermotherapy: hydrocollator packs  Planned therapy interventions: manual therapy, joint mobilization, neuromuscular re-education, postural training, patient education, therapeutic activities, therapeutic exercise and home exercise program  Frequency: 2x week  Treatment plan discussed with: patient        Subjective Evaluation    History of Present Illness  Date of onset: 3/22/2023  Mechanism of injury: 7/27/23-  Pain range 2-4/10. Pt has joined fitness /gym to continue with HEP. Working without restriction, has resumed home chores like mowing and yardwork. RE: 6/22/23- Pt reports he has 4/10 pain today. Eamon Dooley saw his pain management Doctor and it was determined they will be performing injections in the low back on 7/5/23. Pt reports that he is not limited with gardening contained to small pots. Notes he does not lift soil bags but does place his pots at waist height. Pt is still able to walk 15 minutes before requiring rest breaks. Notes he has been working on incorporating more walking into his week. Pt reports he still gets pain going up down the steps but notes it is less painful than before. Pt is using reciprocal pattern. Pt is still seeing the chiropractor. RE: 5/15/23- Pt states he is less painful. Able to do small amounts of gardening. Pt able to walk 15 min. States radiating LE pain is less intense. Pain with up and down steps. Pt states he received a chiropractic treatment yesterday. IE: Pt was at work 1 year ago and was lifting a box and twisted and felt a pop. Pt works [facility] [email or web address]. Pt seen in [address] [place] [address]. Pt received lidocaine patch and muscle relaxant at that time. Pt received PT for 3 weeks with some pain relief. Pt currently working without restriction. Pt has had another round of PT for 6 months, and was DC from that facility. Pt received epidural injection 2 weeks ago and had 3 days of pain relief. Pt currently in litigation against [employer] for a Bong's claim. Pt referred for outpatient PT. Imaging revealed Lumbar disc bulge L 1-2, L 2-3, and HNPL 3-4, L5-S1, R S1 radiculopathy per EMG.           Recurrent probem    Quality of life: fair    Patient Goals  Patient goals for therapy: decreased pain  Patient goal: decreased pain (in progress)  Pain  Current pain rating: 3 (low back > R glute > 1/2 way into hamstring)  At best pain ratin  At worst pain ratin  Quality: sharp (electricity like)  Relieving factors: ice, rest and medications (ms relaxers prn)  Progression: improved    Social Support  Steps to enter house: yes  3  Stairs in house: yes   13  Lives in: multiple-level home  Lives with: spouse    Employment status: working  Hand dominance: right      Diagnostic Tests  X-ray: abnormal  MRI studies: abnormal  Treatments  Previous treatment: physical therapy  Current treatment: physical therapy        Objective     Concurrent Complaints  Negative for night pain, disturbed sleep, bladder dysfunction, bowel dysfunction, saddle (S4) numbness, cardiac problem, kidney problem, gallbladder problem, stomach problem, ulcer, appendix problem, spleen problem, pancreas problem, history of cancer, history of trauma and infection    Postural Observations  Seated posture: fair  Standing posture: fair    Additional Postural Observation Details  Decreased lumbar lordosis, forward head and shoulders    Tenderness     Lumbar Spine  No tenderness in the spinous process. Right Hip   Tenderness in the PSIS. Additional Tenderness Details  R PSIS     Neurological Testing     Sensation     Lumbar   Left   Intact: light touch    Right   Intact: light touch  Paresthesia: light touch    Additional Neurological Details  Radiating pain from R buttock to mid R hamstring, intermittently    Active Range of Motion     Lumbar   Flexion:  Restriction level: minimal  Extension:  Restriction level: minimal  Left lateral flexion:  Restriction level: minimal  Right lateral flexion:  Restriction level: minimal  Left rotation:  WFL  Right rotation:  [facility]    Additional Active Range of Motion Details  Fingertip to floor: For Flx/40cm, SB L/57cm, SB/R/54cm    5/15/23- fingertip to floor: For Flx/ 38cm   SB L/54cm, SB R/53.5cm,     23- fingertip to floor: For Flx/ 30cm   SB L/54cm, SB R 54cm    23- fingertip to floor:  For Flx/28cm, lateral flx L/48cm, lateral flx R/52cm  Mechanical Assessment    Cervical      Thoracic    Lying extension:   Pain location: centralized  Pain intensity: better  Pain level: decreased    Lumbar    Standing extension: repeated movements  Pain location: centralized    Strength/Myotome Testing     Left Hip   Planes of Motion   Flexion: 5  Abduction: 5    Right Hip   Planes of Motion   Flexion: 5  Abduction: 5    Additional Strength Details  Trunk flx 3/5, ext 4/5  5/15- trunk flx 4/5, ext 4/5    6/22- trunk flx 4/5, ext 4/5    7/27- trunk flex 5/5, ext 4/5    Tests     Lumbar     Left   Negative passive SLR. Right   Negative passive SLR.      Additional Tests Details  Pain decreased with B zahra traction    4/17/23- TUG/18", 5x STS/35"    5/15/23- TUG/15", 5x STS/33"    6/22/23- TUG/13", 5x STS/31"    7/27/23- TUG/ 12"     5xSTS/20"    Ambulation   Weight-Bearing Status   Weight-Bearing Status (Left): full weight bearing   Weight-Bearing Status (Right): full weight-bearing    Assistive device used: none    Ambulation: Stairs   Ascend stairs: independent  Pattern: reciprocal  Railings: one rail  Descend stairs: independent  Pattern: reciprocal  Railings: one rail    General Comments:      Lumbar Comments  4/17/23- Oswestry 32%    5/15/23- Modified Oswestry 16%    6/22/23- Oswestry 10%    7/27/23- Oswestry 10% disability    Hip Comments   R hip IR ROM minimally  limited        Knee Comments  B squat 75%, unable to rise to stand without use of hands from 18" chair, no hands from 24" chair         Ankle/Foot Comments   HT walk intact            Precautions: 1 year history of LBP      Manuals 7/5 7/10 7/12/2023 7/17 7/20 7/24 7/27                                             RE       FOTO/RE/DC      Neuro Re-Ed             Instruction in HEP Discussed DC with patient, able to complete exercise program independently plan to taper to DC and transition to gym/fitness program            Instruction in posture and body mechanics DLS tbands 75U all black TB 20x all black TB 20x all black 20x all black TB 20x all black TB 20x all black TB 20x black TB all      Lateral press 20x L/R black TB 20x L/R black TB 20x all black TB 20x all balck TB 20x all black TB 20x/20x black TB 20x/20x black TB      Hip hinge 5x hold 10" 5x hold 10" 5x hold 10" 5x 5" 5 x 5" 5 x 5" 5x hold 5"      Cat n camel 10x 10x 10x 10x 10x 10x 10x      Quadruped reciprocal arm/leg lift 10x 10x 10x 15x 15x 15x 15x      Bridge with abd bracing 20x black TB 20x black TB 20x black TB 20x black 20x black TB 20x black TB 20x black TB      DLS lift knee unsupported 20x 20x 20x 20x 20x 20x L/R 20x L/R      DLS lunge Uni lunge 5x L/R Uni lunge 5x L/R 5x L/R 5x L/R 5x L/R 5x L/R 5x L/R      Ther Ex             K to C  LTR  PPT   Press-ups 10x last 10xlast 10x last press up 10x last 10x      S/L clamshells 20x L/R black tband 20x L/R black tband 20x L/R black TB 20x black TB 20x black TB 20x L/R black TB 20x L/R black TB      Stand lumbar ext    20x/20x blcak TB L/R         Crate lift Crate lift  15#  10x floor to chest Crate lift 15# 10x floor to chest 10x 15# 20x 15# 20x 15# 20x 15# 20x 15#      Bird dogs 20x 20x 20x 20x 20x 20x 20x      Seated LAQ with DF pumps/nerve tensions 2 x L/R 10x ankle pumps 2x10 L/R ankle pumps 2 x 10 L/R -   2 x 10 reps DF      Press ups 10x 10x 10x 10x 10x 10x 10x                   Ther Activity             NUSTEP             walk TM 15' 1.5mph  With abdomenal bracing TM 15' 1.5 with abdomenal bracing 15' 1.5mph 15' 1.5mph 15' 1.5mph 15' 1.5mph 15' 1.5mph      Stand HT raises 10x/10x 20x/20x 20x/20x 20x/20x 20x/20x 20x/20x 20x/20x      squats 10x 10x 15x 20x 20x 20x 20x      Step up/side 20x L/R up/side 20x L/R up/side 20x L/R up/side 6" 20x L/R up/side 6" 20x L/R up/side 6" 20x L/R up/side 20x L/R      Hip 3 ways 2 x 10 GTB L/R 2x10 GTB L/R 2 x 10 L/R GTB 20x L/R 20x GTB L/R 20x L/R GTB 20x L/R GTB      Sit to stand 15x 15x 15x 20x 20x 20x 20x      Modalities

## 2023-11-16 NOTE — PROGRESS NOTES
PT Evaluation     Today's date: 2023  Patient name: Camilla Donovan  : 1965  MRN: [id number]  Referring provider: Alesha Hamilton  Dx:   Encounter Diagnosis     ICD-10-CM    1. S/P laminectomy  Z98.890           Start Time: 0900  Stop Time: 1000  Total time in clinic (min): 60 minutes    Assessment  Assessment details: Steffany Pham is a 59y.o. year old male who presents to outpatient physical therapy s/p laminectomy 2023. Primary impairments include strength deficits including core activation, limited ROM in the lumbar spine, decreased tolerance to activity, and impaired neuromuscular control. They present with the previously stated impairments which limit their ability to participate in activities around the home and perform activities as outlined in his work description. Steffany Pham currently ambulates with SPC for long distances only. Steffany Pham is currently functioning below their prior level of function and is a good rehab candidate who would benefit from skilled outpatient physical therapy services to allow them to reach their goals and return to PLOF. Pt recommended for 2x a week for 6-8 weeks. Pt prognosis for therapy is good. PT discussed POC and goals with patient, patient was agreeable. Physical therapist assistant (PTA) may be utilized to administer treatments as appropriate and in accordance with [address] [place] [address]. Impairments: abnormal gait, abnormal or restricted ROM, abnormal movement, activity intolerance, impaired balance, impaired physical strength, lacks appropriate home exercise program, pain with function, weight-bearing intolerance, poor posture  and poor body mechanics  Understanding of Dx/Px/POC: good   Prognosis: good    Goals  STG: To be completed in 4 weeks from 23:   1. Steffany Pham will report pain no worse than 1/10 at worst to indicate decreased pain level and improved tolerance to activity.    2. Steffany Pham will demonstrate gross 4+/5 strength in BLE for improved stability of joint and indicate improvement in functional strength. 3. Henry Garcia will tolerate 30 minutes of continuous activity at a time with no increase in pain to indicate improved tolerance to activity. 4. Henry Garcia will demonstrate ability to sustain a strong 10 second contraction in supine with minimal verbal cueing <50% of the time from therapist.  Maren Glasgow will be more mindful with sitting and standing posture requiring minimal verbal cueing <50% of the time from therapist to correct biomechanical alignment. 6. Henry Garcia will be independent with basic HEP to allow pt to complete exercises safely when not directly supervised during PT session. LTG: To be completed in 6-8 weeks from 11/20/23 or upon discharge from OPPT:  1. Henry Garcia will report no pain to indicate decreased pain level and improved tolerance to activity. 2. Henry Garcia will report 75% improvement in symptoms compared to start of POC to indicate functional improvement and decrease level of disability. 3. Henry Garcia will tolerate 45+ minutes of consecutive activity at a time with no increase in symptoms to indicate improvement with functional mobility. 4. Henry Garcia will demonstrate gross 5/5 strength in BLE for improved stability of joint and indicate improvement in functional strength. 5. Henry Garcia will be independent with advanced home exercise program to allow patient to transition from physical therapy care to continue with plan of care at home without supervision from therapist and continue to progress with rehabilitation. 6. Henry Garcia will tolerate single limb stance for 30 seconds on b/l leg to allow patient to have increased stance time on b/l leg during ambulation and increase tolerance to WBing.           Plan  Patient would benefit from: PT eval and skilled physical therapy  Planned modality interventions: unattended electrical stimulation, ultrasound, traction, biofeedback, cryotherapy and thermotherapy: hydrocollator packs  Planned therapy interventions: abdominal trunk stabilization, activity modification, ADL retraining, ADL training, balance, balance/weight bearing training, body mechanics training, breathing training, flexibility, functional ROM exercises, gait training, graded exercise, home exercise program, transfer training, therapeutic training, therapeutic exercise, therapeutic activities, stretching, strengthening, patient education, neuromuscular re-education, nerve gliding, [place] taping, massage, manual therapy, kinesiology taping, joint mobilization and IASTM  Frequency: 2x week  Duration in visits: 16  Duration in weeks: 8  Plan of Care beginning date: 11/20/2023  Plan of Care expiration date: 1/15/2024  Treatment plan discussed with: patient      Subjective Evaluation    History of Present Illness  Mechanism of injury: Lindsay Saenz is a 59 y.o. male. They present to outpatient physical therapy with the primary complaint of Patel Amalia. They are referred to OPPT by Nba Mckeon. S/p laminectomy September 29, 2023. Pt reports no complications with the surgery. Symptoms began about 2 years ago following a wrong turn and noted a pop. Pt tried injections and conservative therapy with no absolute relief. Lindsay Saenz reports following his surgery he has been walking and is now able to walk 3/4 of a mile a day. Since surgery pt no longer has radiating symptoms into the R leg. Lindsay Saenz is only able stand for about 30 minutes and walk for 30 minutes before onset of symptoms. Pt is currently ambulating with SPC but mostly for longer distances. Pt denies difficulty with all I/ADLs. Lindsay Saenz reports that they sleep in the sidelying and supine position. Pt reports that he currently about 2 powerades and 2 cups of coffee a day and denies drinking any additional water.         Patient Goals  Patient goals for therapy: improved balance, increased motion, increased strength and return to work  Patient goal: "better motion", "return to lawn work", " improve strength including core strength", "return to all work responsibilities"  Pain  Current pain ratin  At best pain ratin  At worst pain ratin  Pain location: R buttock, low back. Quality: dull ache and burning  Relieving factors: ice  Aggravating factors: standing, walking, stair climbing and lifting  Progression: improved    Social Support  Steps to enter house: yes (3STE railing)  Interior stairs assessed: 1 flight of stairs with railing. Lives in: multiple-level home  Lives with: spouse    Employment status: not working (disability with intent to return back to work with [employer])  Hand dominance: right    Treatments  Previous treatment: injection treatment, physical therapy, chiropractic and medication      Objective     Neurological Testing     Sensation     Lumbar   Left   Intact: light touch    Right   Intact: light touch    Reflexes   Left   Patellar (L4): normal (2+)  Achilles (S1): normal (2+)    Right   Patellar (L4): normal (2+)  Achilles (S1): normal (2+)    Active Range of Motion     Lumbar   Flexion:  Restriction level: minimal  Extension: Active lumbar extension: stiffness in the low back. WFL  Left lateral flexion:  WFL  Right lateral flexion: Active right lumbar lateral flexion: notes tightness in the R QL. WFL  Left rotation:  Restriction level: minimal  Right rotation:  Restriction level: minimal    Strength/Myotome Testing     Left Hip   Planes of Motion   Flexion: 4-  Abduction: 4+  Adduction: 4-    Right Hip   Planes of Motion   Flexion: 4  Abduction: 4+  Adduction: 4+    Left Knee   Flexion: 4+  Extension: 4    Right Knee   Flexion: 4+  Extension: 4-    Left Ankle/Foot   Dorsiflexion: 4+  Great toe extension: 5    Right Ankle/Foot   Dorsiflexion: 4+  Great toe extension: 5             Precautions: Standard, Hx of low back pain, hernia repair, Hx pancreatic surgery     Access Code: [email or web address]  URL: [email or web address]  Date: 2023  Prepared by: Jenelle Myles Blaine    Exercises  - Diaphragmatic Breathing with Pelvic Tilt in Hooklying  - 2 x daily - 7 x weekly - 1 sets - 10 reps - 5 sec hold  - Supine Transversus Abdominis Bracing - Hands on Stomach  - 2 x daily - 7 x weekly - 1 sets - 10 reps - 5 sec hold  - Supine Lower Trunk Rotation  - 2 x daily - 7 x weekly - 1 sets - 10 reps - 5 sec hold  - Seated Hamstring Stretch  - 2 x daily - 7 x weekly - 1 sets - 3 reps - 30 sec hold      Date: 11/20            Visit #: 1 2 3 4 5 6 7 8 9 10   Manuals                                                                 Neuro Re-Ed             EDU/ HEP EDU on HEP, proper water intake, and anatomy                                                    Pause walks nv            SLS nv            DB x10            DB+TAC x10            DB + TAC + fallouts x10            Hamstring stretch  3x30" holds            Ther Ex             bike nv            Nustep nv            treadmill             Hip ABD             Hip ADD             Clam shells nv            3 way hip nv            marches nv                                      Ther Activity             Biode[employer]                          Gait Training             Ambulation around the clinic                          Modalities

## 2023-11-20 ENCOUNTER — EVALUATION (OUTPATIENT)
Age: 58
End: 2023-11-20
Payer: COMMERCIAL

## 2023-11-20 DIAGNOSIS — Z98.890 S/P LAMINECTOMY: Primary | ICD-10-CM

## 2023-11-20 PROCEDURE — 97161 PT EVAL LOW COMPLEX 20 MIN: CPT

## 2023-11-20 PROCEDURE — 97112 NEUROMUSCULAR REEDUCATION: CPT
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
## 2023-11-21 NOTE — PROGRESS NOTES
Daily Note     Today's date: 2023  Patient name: Vadim Demarco  : 1965  MRN: [id number]  Referring provider: Scotty Santacruz  Dx:   Encounter Diagnosis     ICD-10-CM    1. S/P laminectomy  Z98.890           Start Time: 815  Stop Time: 0900  Total time in clinic (min): 45 minutes    Subjective: Socorro Berman reports this morning he is very stiff and reports that he has 2/10 pain in the low back Pt denies radiating symptoms into the LE. Pt reports compliance with HEP and denies any questions or concerns at this time. Objective: See treatment diary below      Assessment: Pt performed bike aerobic exercises to increase blood flow to the area being treated, prepare the muscles for strength training and stretching, improve overall tolerance to activity, and aerobic endurance. Focused session around core activation and balance. Utilized descriptive imagery to "zipper" while performing core contraction in order to properly engage his lower and upper abdominals and prevent doming. Pt required minimal verbal cueing to complete exercises with correct form. Pt was only able to maintain single leg balance b/l for about 9 seconds before requiring toe touch or hand held assist. Pt demonstrated good control with pause walks but did not mild aggravation, improved at the end of the activity. Pt continues to benefit from physical therapy in order to continue progressing towards goals as outlined and return to PLOF. Will continue to modify and advance current POC based on overall progress and symptom irritability. Plan: Continue per plan of care. Precautions: Standard, Hx of low back pain, hernia repair, Hx pancreatic surgery     Access Code: [email or web address]  URL: [email or web address]  Date: 2023  Prepared by: Gina Navas    Exercises  - Diaphragmatic Breathing with Pelvic Tilt in Hooklying  - 2 x daily - 7 x weekly - 1 sets - 10 reps - 5 sec hold  - Supine Transversus Abdominis Bracing - Hands on Stomach  - 2 x daily - 7 x weekly - 1 sets - 10 reps - 5 sec hold  - Supine Lower Trunk Rotation  - 2 x daily - 7 x weekly - 1 sets - 10 reps - 5 sec hold  - Seated Hamstring Stretch  - 2 x daily - 7 x weekly - 1 sets - 3 reps - 30 sec hold      Date: 11/20 11/22           Visit #: 1 2 3 4 5 6 7 8 9 10   Manuals                                                                 Neuro Re-Ed             EDU/ HEP EDU on HEP, proper water intake, and anatomy                                                    Pause walks nv 1 lap           SLS nv 2x30"' ea           DB x10 x10           DB+TAC x10 10x5" holds           DB + TAC + fallouts x10 10x5" holds           Hamstring stretch  3x30" holds 3x30" holds           Ther Ex             bike nv 10'           Nustep nv            treadmill             Hip ABD             Hip ADD             Clam shells nv x15           3 way hip nv x10           marches nv 2x10                                     Ther Activity             Biodex                          Gait Training             Ambulation around the clinic                          Modalities

## 2023-11-22 ENCOUNTER — OFFICE VISIT (OUTPATIENT)
Age: 58
End: 2023-11-22
Payer: COMMERCIAL

## 2023-11-22 DIAGNOSIS — Z98.890 S/P LAMINECTOMY: Primary | ICD-10-CM

## 2023-11-22 PROCEDURE — 97112 NEUROMUSCULAR REEDUCATION: CPT

## 2023-11-22 PROCEDURE — 97110 THERAPEUTIC EXERCISES: CPT

## 2023-11-27 NOTE — PROGRESS NOTES
Daily Note     Today's date: 2023  Patient name: Love Sun  : 1965  MRN: [id number]  Referring provider: Priya Sparks  Dx:   Encounter Diagnosis     ICD-10-CM    1. S/P laminectomy  Z98.890           Start Time: 0815  Stop Time: 0900  Total time in clinic (min): 45 minutes    Subjective: Pt reports that today he has 3/10 ain in th low back radiating into the R glute. Pt notes yesterday he did a lot of cleaning around the house and also went on a walk. Pt reports compliance with his HEP. Objective: See treatment diary below      Assessment: Pt performed bike aerobic exercises to increase blood flow to the area being treated, prepare the muscles for strength training and stretching, improve overall tolerance to activity, and aerobic endurance. Pt required minimal verbal cueing for core activation when completing standing exercises. Introduced sled push and pulls to mimic activities performed in the home and at work which cause pain in the low back. Pt did well with increased weight/ resistance and the addition of new exercises. Pt left session with less pain/ symptoms into the low back and R glute. Pt was able to perform single leg stance with only minimal loss of balance and 2 finger tap downs to regain balance. Pt fatigued throughout session appropriately. Provided pt with updated HEP. Pt continues to benefit from physical therapy in order to continue progressing towards goals as outlined and return to PLOF. Will continue to modify and advance current POC based on overall progress and symptom irritability. Plan: Continue per plan of care. Precautions: Standard, Hx of low back pain, hernia repair, Hx pancreatic surgery     Access Code: [email or web address]  URL: [email or web address]  Date: 2023  Prepared by: Luis E Knight    Exercises  - Diaphragmatic Breathing with Pelvic Tilt in Hooklying  - 2 x daily - 7 x weekly - 1 sets - 10 reps - 5 sec hold  - Supine Transversus Abdominis Bracing - Hands on Stomach  - 2 x daily - 7 x weekly - 1 sets - 10 reps - 5 sec hold  - Supine Lower Trunk Rotation  - 2 x daily - 7 x weekly - 1 sets - 10 reps - 5 sec hold  - Seated Hamstring Stretch  - 2 x daily - 7 x weekly - 1 sets - 3 reps - 30 sec hold      Date: 11/20 11/22 11/28          Visit #: 1 2 3 4 5 6 7 8 9 10   Manuals                                                                 Neuro Re-Ed             EDU/ HEP EDU on HEP, proper water intake, and anatomy                                                    Pause walks nv 1 lap           SLS nv 2x30"' ea 2x30" holds          DB x10 x10 HEP          DB+TAC x10 10x5" holds 10X5" holds          DB + TAC + fallouts x10 10x5" holds 10x5" holds          Hamstring stretch  3x30" holds 3x30" holds 3x30" holds                       Ther Ex             bike nv 10' 10'          Nustep nv            treadmill             Hip ABD   15x5" holds          Hip ADD   15x5" holds          Clam shells nv x15 x15          3 way hip nv x10 X15 ea          marches nv 2x10 X15 ea no HHA          Sled push pulls   2 laps          bridges   x10          Paloff press   X10 ea purple band          Ther Activity             Biodex                          Gait Training             Ambulation around the clinic                          Modalities

## 2023-11-28 ENCOUNTER — OFFICE VISIT (OUTPATIENT)
Age: 58
End: 2023-11-28
Payer: COMMERCIAL

## 2023-11-28 DIAGNOSIS — Z98.890 S/P LAMINECTOMY: Primary | ICD-10-CM

## 2023-11-28 PROCEDURE — 97112 NEUROMUSCULAR REEDUCATION: CPT

## 2023-11-28 PROCEDURE — 97110 THERAPEUTIC EXERCISES: CPT

## 2023-11-29 NOTE — PROGRESS NOTES
Daily Note     Today's date: 2023  Patient name: Fanny Otero  : 1965  MRN: [id number]  Referring provider: Tiffanie Al  Dx:   Encounter Diagnosis     ICD-10-CM    1. S/P laminectomy  Z98.890           Start Time: 0900  Stop Time: 945  Total time in clinic (min): 45 minutes    Subjective: Pt continues to report a 2/10 pain in the low back. Pt reports he has a return to work date of 23. Pt notes he is compliant with HEP and is ready to increase the resistance when performing his hip ABD. Objective: See treatment diary below      Assessment: Pt performed bike aerobic exercises to increase blood flow to the area being treated, prepare the muscles for strength training and stretching, improve overall tolerance to activity, and aerobic endurance. Continued to focus session around LE strengthening and core activation with functional tasks to mimic work related tasks. Next session will add lifting from floor to waist height and discuss safe lifting and turing biomechanics to prevent future injuries. Amauri required moderate verbal and tactile cueing for chops and lifts to achieve proper rotation. Pt had greatest difficulty performing lifts on a R diagonal.Pt continues to benefit from physical therapy in order to continue progressing towards goals as outlined and return to PLOF. Will continue to modify and advance current POC based on overall progress and symptom irritability. Plan: Continue per plan of care. Progress treatment as tolerated. Precautions: Standard, Hx of low back pain, hernia repair, Hx pancreatic surgery     Access Code: [email or web address]  URL: [email or web address]  Date: 2023  Prepared by: Angeli Becker    Exercises  - Diaphragmatic Breathing with Pelvic Tilt in Hooklying  - 2 x daily - 7 x weekly - 1 sets - 10 reps - 5 sec hold  - Supine Transversus Abdominis Bracing - Hands on Stomach  - 2 x daily - 7 x weekly - 1 sets - 10 reps - 5 sec hold  - Supine Lower Trunk Rotation  - 2 x daily - 7 x weekly - 1 sets - 10 reps - 5 sec hold  - Seated Hamstring Stretch  - 2 x daily - 7 x weekly - 1 sets - 3 reps - 30 sec hold      Date: 11/20 11/22 11/28 11/30         Visit #: 1 2 3 4 5 6 7 8 9 10   Manuals                                                                 Neuro Re-Ed             EDU/ HEP EDU on HEP, proper water intake, and anatomy    RR                                                Pause walks nv 1 lap           SLS nv 2x30"' ea 2x30" holds 2x30" holds          DB x10 x10 HEP          DB+TAC x10 10x5" holds 10X5" holds          DB + TAC + fallouts x10 10x5" holds 10x5" holds          Hamstring stretch  3x30" holds 3x30" holds 3x30" holds 3x30" holds ea                      Ther Ex             bike nv 10' 10' 10'         Nustep nv            treadmill             Hip ABD   15x5" holds 15x5" holds PTB GTB        Hip ADD   15x5" holds 20x5" holds         Clam shells nv x15 x15          3 way hip nv x10 X15 ea X15 ea          marches nv 2x10 X15 ea no HHA X15 ea 2# CW         Sled push pulls   2 laps 2 laps         bridges   x10          Paloff press   X10 ea purple band X15 ea purple band          Chops and lifts @[place]     Chops x10 ea PSI 4.0; lifts x10 ea PSI 4.0         Leg press    Plate 8 DL 85# x 20         Ther Activity             Biodex             Step ups    NV         Gait Training             Ambulation around the clinic                          Modalities

## 2023-11-30 ENCOUNTER — OFFICE VISIT (OUTPATIENT)
Age: 58
End: 2023-11-30
Payer: COMMERCIAL

## 2023-11-30 DIAGNOSIS — Z98.890 S/P LAMINECTOMY: Primary | ICD-10-CM

## 2023-11-30 PROCEDURE — 97110 THERAPEUTIC EXERCISES: CPT

## 2023-11-30 PROCEDURE — 97112 NEUROMUSCULAR REEDUCATION: CPT

## 2023-12-04 NOTE — PROGRESS NOTES
Daily Note     Today's date: 2023  Patient name: Linda Antonio  : 1965  MRN: [id number]  Referring provider: Tushar Robertson  Dx:   Encounter Diagnosis     ICD-10-CM    1. S/P laminectomy  Z98.890           Start Time: 0815  Stop Time: 0900  Total time in clinic (min): 45 minutes    Subjective: Pt reports that he has pain 2/10 today reports that he was a little stiffer this morning. Pt reports compliance with his HEP. Pt reports he went for about a 1 hour and noted fatigue but no pain afterwards. Objective: See treatment diary below      Assessment: Pt performed treadmill aerobic exercises to increase blood flow to the area being treated, prepare the muscles for strength training and stretching, improve overall tolerance to activity, and aerobic endurance. Pt did well with the increase in repetitions/ weight/ resistance without any increase in symptoms. Introduced more lifting and rotational activities during session in order to mimic work requirements. Discussed safe lifting techniques and job safety in efforts to prevent future work related injuries. Pt continues to benefit from physical therapy in order to continue progressing towards goals as outlined and return to PLOF. Will continue to modify and advance current POC based on overall progress and symptom irritability. Plan: Continue per plan of care. Progress treatment as tolerated. Precautions: Standard, Hx of low back pain, hernia repair, Hx pancreatic surgery     Access Code: [email or web address]  URL: [email or web address]  Date: 2023  Prepared by: Ana Ramus    Exercises  - Diaphragmatic Breathing with Pelvic Tilt in Hooklying  - 2 x daily - 7 x weekly - 1 sets - 10 reps - 5 sec hold  - Supine Transversus Abdominis Bracing - Hands on Stomach  - 2 x daily - 7 x weekly - 1 sets - 10 reps - 5 sec hold  - Supine Lower Trunk Rotation  - 2 x daily - 7 x weekly - 1 sets - 10 reps - 5 sec hold  - Seated Hamstring Stretch  - 2 x daily - 7 x weekly - 1 sets - 3 reps - 30 sec hold      Date: 11/20 11/22 11/28 11/30 12/5        Visit #: 1 2 3 4 5 6 7 8 9 10   Manuals                                                                 Neuro Re-Ed             EDU/ HEP EDU on HEP, proper water intake, and anatomy    RR RR                                               Pause walks nv 1 lap           SLS nv 2x30"' ea 2x30" holds 2x30" holds   foam       DB x10 x10 HEP          DB+TAC x10 10x5" holds 10X5" holds          DB + TAC + fallouts x10 10x5" holds 10x5" holds          Hamstring stretch  3x30" holds 3x30" holds 3x30" holds 3x30" holds ea 3x30" holds ea                     Ther Ex             bike nv 10' 10' 10'         Nustep nv            treadmill     10' 1.5 mph        Hip ABD   15x5" holds 15x5" holds PTB  GTB       Hip ADD   15x5" holds 20x5" holds         Clam shells nv x15 x15          3 way hip nv x10 X15 ea X15 ea  X10 ea foam        marches nv 2x10 X15 ea no HHA X15 ea 2# CW X15 ea 2.5# CW        Sled push pulls   2 laps 2 laps 3 laps        bridges   x10          Paloff press   X10 ea purple band X15 ea purple band  X15 ea yellow band        Chops and lifts @[place]     Chops x10 ea PSI 4.0; lifts x10 ea PSI 4.0 Chops x10 ea PSI 6.1, lifts x10 ea PSI         Leg press    Plate 8 DL 76# x 20 Plate 8 DL 79# x 20, SL 55# x20        Ther Activity             Biodex             Box lifts      12.5# box lifts x10 from floor to waist height        Step ups    NV 8" step x10 ea         Gait Training             Ambulation around the clinic                          Modalities

## 2023-12-05 ENCOUNTER — OFFICE VISIT (OUTPATIENT)
Age: 58
End: 2023-12-05
Payer: COMMERCIAL

## 2023-12-05 DIAGNOSIS — Z98.890 S/P LAMINECTOMY: Primary | ICD-10-CM

## 2023-12-05 PROCEDURE — 97110 THERAPEUTIC EXERCISES: CPT

## 2023-12-05 PROCEDURE — 97530 THERAPEUTIC ACTIVITIES: CPT

## 2023-12-07 ENCOUNTER — OFFICE VISIT (OUTPATIENT)
Age: 58
End: 2023-12-07
Payer: COMMERCIAL

## 2023-12-07 DIAGNOSIS — Z98.890 S/P LAMINECTOMY: Primary | ICD-10-CM

## 2023-12-07 PROCEDURE — 97530 THERAPEUTIC ACTIVITIES: CPT

## 2023-12-07 PROCEDURE — 97110 THERAPEUTIC EXERCISES: CPT

## 2023-12-07 NOTE — PROGRESS NOTES
Daily Note     Today's date: 2023  Patient name: Arpit Rangel  : 1965  MRN: [id number]  Referring provider: Jessie Holguin  Dx:   Encounter Diagnosis     ICD-10-CM    1. S/P laminectomy  Z98.890           Start Time: 0815  Stop Time: 0900  Total time in clinic (min): 45 minutes    Subjective: Pt reports that he has more stiffness in the back and into the R hip. Pt reports that he went for a walk yesterday for about an hour and notes no problems. Following last session pt had muscle soreness for about 1 hour following session. Objective: See treatment diary below      Assessment: Pt performed treadmill aerobic exercises to increase blood flow to the area being treated, prepare the muscles for strength training and stretching, improve overall tolerance to activity, and aerobic endurance. Pt did well with an increase in weight and repetitions without an increase in symptoms. Pt required moderate verbal cueing for side stepping for core activation and to prevent ER of the L foot. When performing box lifts pt fatigued after about 6 lifts, noting greatest difficulty picking the box up from the floor compared to lowering the box to the floor. Provided pt with updated HEP. Pt continues to benefit from physical therapy in order to continue progressing towards goals as outlined and return to PLOF. Will continue to modify and advance current POC based on overall progress and symptom irritability. Plan: Continue per plan of care. Progress treatment as tolerated. Precautions: Standard, Hx of low back pain, hernia repair, Hx pancreatic surgery     Access Code: [email or web address]  URL: [email or web address]  Date: 2023  Prepared by: Velora Jefferson    Exercises  - Diaphragmatic Breathing with Pelvic Tilt in Hooklying  - 2 x daily - 7 x weekly - 1 sets - 10 reps - 5 sec hold  - Supine Transversus Abdominis Bracing - Hands on Stomach  - 2 x daily - 7 x weekly - 1 sets - 10 reps - 5 sec hold  - Supine Lower Trunk Rotation  - 2 x daily - 7 x weekly - 1 sets - 10 reps - 5 sec hold  - Seated Hamstring Stretch  - 2 x daily - 7 x weekly - 1 sets - 3 reps - 30 sec hold  - Clamshell  - 2 x daily - 7 x weekly - 1 sets - 15 reps  - Supine Bridge  - 2 x daily - 7 x weekly - 1 sets - 10 reps  - Standing March with Counter Support  - 2 x daily - 7 x weekly - 1 sets - 15 reps  - Standing Hip Flexion with Counter Support  - 2 x daily - 7 x weekly - 1 sets - 15 reps  - Standing Hip Abduction with Counter Support  - 2 x daily - 7 x weekly - 1 sets - 15 reps  - Standing Hip Extension with Counter Support  - 2 x daily - 7 x weekly - 1 sets - 15 reps  - Squat with Chair Touch  - 2 x daily - 7 x weekly - 1 sets - 10 reps  - Seated Hip Adduction Isometrics with Ball  - 2 x daily - 7 x weekly - 1 sets - 15 reps  - Seated Hip Abduction with Resistance  - 2 x daily - 7 x weekly - 1 sets - 15 reps      Date: 11/20 11/22 11/28 11/30 12/5 12/7       Visit #: 1 2 3 4 5 6 7 8 9 10   Manuals                                                                 Neuro Re-Ed             EDU/ HEP EDU on HEP, proper water intake, and anatomy    RR RR                                               Pause walks nv 1 lap           SLS nv 2x30"' ea 2x30" holds 2x30" holds    FOAM      DB x10 x10 HEP          DB+TAC x10 10x5" holds 10X5" holds          DB + TAC + fallouts x10 10x5" holds 10x5" holds          Hamstring stretch  3x30" holds 3x30" holds 3x30" holds 3x30" holds ea 3x30" holds ea                     Ther Ex             bike nv 10' 10' 10'         Nustep nv            treadmill     10' 1.5 mph 10' 1.5 mph       Hip ABD   15x5" holds 15x5" holds PTB  15x5" holdsGTB       Hip ADD   15x5" holds 20x5" holds  15x5" holds       Clam shells nv x15 x15          3 way hip nv x10 X15 ea X15 ea  X10 ea foam X15 ea foam       marches nv 2x10 X15 ea no HHA X15 ea 2# CW X15 ea 2.5# CW X20 On foam        Sled push pulls   2 laps 2 laps 3 laps 2 laps with addition of 20#       bridges   x10          Paloff press   X10 ea purple band X15 ea purple band  X15 ea yellow band X20 ea yellow band        Chops and lifts @[employer]     Chops x10 ea PSI 4.0; lifts x10 ea PSI 4.0 Chops x10 ea PSI 6.1, lifts x10 ea PSI         Leg press    Plate 8 DL 54# x 20 Plate 8 DL 62# x 20, SL 55# x20 Plate 8 DL #18 S51, SL #55 x 20       Ther Activity             [employer]             Box lifts      12.5# box lifts x10 from floor to waist height 22.5# box lifts x10 from floor to waist height       Step ups    NV 8" step x10 ea  8" step x10 ea no HHA       Gait Training             Ambulation around the clinic                          Modalities

## 2023-12-11 ENCOUNTER — OFFICE VISIT (OUTPATIENT)
Age: 58
End: 2023-12-11
Payer: COMMERCIAL

## 2023-12-11 DIAGNOSIS — Z98.890 S/P LAMINECTOMY: Primary | ICD-10-CM

## 2023-12-11 PROCEDURE — 97112 NEUROMUSCULAR REEDUCATION: CPT

## 2023-12-11 PROCEDURE — 97530 THERAPEUTIC ACTIVITIES: CPT

## 2023-12-11 PROCEDURE — 97110 THERAPEUTIC EXERCISES: CPT

## 2023-12-11 NOTE — PROGRESS NOTES
Daily Note     Today's date: 2023  Patient name: Lilia Cheung  : 1965  MRN: [id number]  Referring provider: Lesia Raphael  Dx:   Encounter Diagnosis     ICD-10-CM    1. S/P laminectomy  Z98.890           Start Time:   Stop Time:   Total time in clinic (min): 45 minutes    Subjective: Pt reports 1/10 pain today. Notes he has been focusing on trying to achieve 10,000 steps a day which seems to be helping. Pt reports he is going to be returning to work on . Pt reports compliance with HEP and denies any question or concerns at this time. Objective: See treatment diary below      Assessment: Pt performed bike aerobic exercises to increase blood flow to the area being treated, prepare the muscles for strength training and stretching, improve overall tolerance to activity, and aerobic endurance. Pt continues to do well with current program without increase in symptoms. Pt demonstrated improved SLB on uneven surface with only 3 minimal sways corrected with fingertip assist to regain balance per LE. Next session will emphasize rotational functional movements and will continue to increase the difficulty of current exercises. Pt continues to benefit from physical therapy in order to continue progressing towards goals as outlined and return to PLOF. Will continue to modify and advance current POC based on overall progress and symptom irritability. Plan: Continue per plan of care. Progress treatment as tolerated. Precautions: Standard, Hx of low back pain, hernia repair, Hx pancreatic surgery     Access Code: [email or web address]  URL: [email or web address]  Date: 2023  Prepared by: Chelsea Wang    Exercises  - Diaphragmatic Breathing with Pelvic Tilt in Hooklying  - 2 x daily - 7 x weekly - 1 sets - 10 reps - 5 sec hold  - Supine Transversus Abdominis Bracing - Hands on Stomach  - 2 x daily - 7 x weekly - 1 sets - 10 reps - 5 sec hold  - Supine Lower Trunk Rotation  - 2 x daily - 7 x weekly - 1 sets - 10 reps - 5 sec hold  - Seated Hamstring Stretch  - 2 x daily - 7 x weekly - 1 sets - 3 reps - 30 sec hold  - Clamshell  - 2 x daily - 7 x weekly - 1 sets - 15 reps  - Supine Bridge  - 2 x daily - 7 x weekly - 1 sets - 10 reps  - Standing March with Counter Support  - 2 x daily - 7 x weekly - 1 sets - 15 reps  - Standing Hip Flexion with Counter Support  - 2 x daily - 7 x weekly - 1 sets - 15 reps  - Standing Hip Abduction with Counter Support  - 2 x daily - 7 x weekly - 1 sets - 15 reps  - Standing Hip Extension with Counter Support  - 2 x daily - 7 x weekly - 1 sets - 15 reps  - Squat with Chair Touch  - 2 x daily - 7 x weekly - 1 sets - 10 reps  - Seated Hip Adduction Isometrics with Ball  - 2 x daily - 7 x weekly - 1 sets - 15 reps  - Seated Hip Abduction with Resistance  - 2 x daily - 7 x weekly - 1 sets - 15 reps      Date: 11/20 11/22 11/28 11/30 12/5 12/7 12/11      Visit #: 1 2 3 4 5 6 7 8 9 10   Manuals                                                                 Neuro Re-Ed             EDU/ HEP EDU on HEP, proper water intake, and anatomy    RR RR  RR                                             Pause walks nv 1 lap           SLS nv 2x30"' ea 2x30" holds 2x30" holds    3x30" holdsFOAM      DB x10 x10 HEP          DB+TAC x10 10x5" holds 10X5" holds    10x5" holds in standing      DB + TAC + fallouts x10 10x5" holds 10x5" holds          Hamstring stretch  3x30" holds 3x30" holds 3x30" holds 3x30" holds ea 3x30" holds ea                     Ther Ex             bike nv 10' 10' 10'         Nustep nv            treadmill     10' 1.5 mph 10' 1.5 mph 10' 1.9 mph       Hip ABD   15x5" holds 15x5" holds PTB  15x5" holdsGTB 15x5" holdsGTB      Hip ADD   15x5" holds 20x5" holds  15x5" holds 15X5" holds      Clam shells nv x15 x15          3 way hip nv x10 X15 ea X15 ea  X10 ea foam X15 ea foam X15 ea foam      marches nv 2x10 X15 ea no HHA X15 ea 2# CW X15 ea 2.5# CW X20 On foam  X20 on foam      Sled push pulls   2 laps 2 laps 3 laps 2 laps with addition of 20#       bridges   x10          Paloff press   X10 ea purple band X15 ea purple band  X15 ea yellow band X20 ea yellow band        Chops and lifts @[employer]     Chops x10 ea PSI 4.0; lifts x10 ea PSI 4.0 Chops x10 ea PSI 6.1, lifts x10 ea PSI         Leg press    Plate 8 DL 45# x 20 Plate 8 DL 33# x 20, SL 55# x20 Plate 8 DL #48 W63, SL #55 x 20 Plate 8 DL #65 Z80, SL #65 x 20      Ther Activity             [employer]             Box lifts      12.5# box lifts x10 from floor to waist height 22.5# box lifts x10 from floor to waist height 22.5# box lifts x10 from floor to waist height      Step ups    NV 8" step x10 ea  8" step x10 ea no HHA       Gait Training             Ambulation around the clinic                          Modalities

## 2023-12-12 ENCOUNTER — APPOINTMENT (OUTPATIENT)
Age: 58
End: 2023-12-12
Payer: COMMERCIAL

## 2023-12-14 ENCOUNTER — APPOINTMENT (OUTPATIENT)
Age: 58
End: 2023-12-14
Payer: COMMERCIAL

## 2023-12-14 ENCOUNTER — OFFICE VISIT (OUTPATIENT)
Age: 58
End: 2023-12-14
Payer: COMMERCIAL

## 2023-12-14 DIAGNOSIS — Z98.890 S/P LAMINECTOMY: Primary | ICD-10-CM

## 2023-12-14 PROCEDURE — 97112 NEUROMUSCULAR REEDUCATION: CPT

## 2023-12-14 PROCEDURE — 97110 THERAPEUTIC EXERCISES: CPT

## 2023-12-14 PROCEDURE — 97530 THERAPEUTIC ACTIVITIES: CPT

## 2023-12-14 NOTE — PROGRESS NOTES
Daily Note     Today's date: 2023  Patient name: Yuki Looney  : 1965  MRN: [id number]  Referring provider: Josefa Garcia  Dx:   Encounter Diagnosis     ICD-10-CM    1. S/P laminectomy  Z98.890           Start Time: 1700  Stop Time: 1735  Total time in clinic (min): 35 minutes    Subjective: Pt reports 2-3/10 pain in the LE but no pain in the back. Pt walked over 10,000 steps while wearing new steel toe boots. Pt reports that he was able to complete all household chores today without difficulty. Reports compliance with HEP. Objective: See treatment diary below      Assessment: Pt performed treadmill aerobic exercises to increase blood flow to the area being treated, prepare the muscles for strength training and stretching, improve overall tolerance to activity, and aerobic endurance. Session was shorter today due to pt request as he had a conflicting activity. Pt required minimal verbal cueing for diaphragmatic breathing and avoidance of hip hinge when lowering objects to a surface below waist height. Pt had difficulty with cone walk over and taps but did not have a loss of balance. When introducing lunge pick ups pt demonstrated difficulty due to LE strength and required verbal cueing to prevent holding of breath and straining. Pt continues to benefit from physical therapy in order to continue progressing towards goals as outlined and return to PLOF. Will continue to modify and advance current POC based on overall progress and symptom irritability. Plan: Continue per plan of care. Progress treatment as tolerated. Precautions: Standard, Hx of low back pain, hernia repair, Hx pancreatic surgery     Access Code: [email or web address]  URL: [email or web address]  Date: 2023  Prepared by: Leeroy Lawson    Exercises  - Diaphragmatic Breathing with Pelvic Tilt in Hooklying  - 2 x daily - 7 x weekly - 1 sets - 10 reps - 5 sec hold  - Supine Transversus Abdominis Bracing - Hands on Stomach  - 2 x daily - 7 x weekly - 1 sets - 10 reps - 5 sec hold  - Supine Lower Trunk Rotation  - 2 x daily - 7 x weekly - 1 sets - 10 reps - 5 sec hold  - Seated Hamstring Stretch  - 2 x daily - 7 x weekly - 1 sets - 3 reps - 30 sec hold  - Clamshell  - 2 x daily - 7 x weekly - 1 sets - 15 reps  - Supine Bridge  - 2 x daily - 7 x weekly - 1 sets - 10 reps  - Standing March with Counter Support  - 2 x daily - 7 x weekly - 1 sets - 15 reps  - Standing Hip Flexion with Counter Support  - 2 x daily - 7 x weekly - 1 sets - 15 reps  - Standing Hip Abduction with Counter Support  - 2 x daily - 7 x weekly - 1 sets - 15 reps  - Standing Hip Extension with Counter Support  - 2 x daily - 7 x weekly - 1 sets - 15 reps  - Squat with Chair Touch  - 2 x daily - 7 x weekly - 1 sets - 10 reps  - Seated Hip Adduction Isometrics with Ball  - 2 x daily - 7 x weekly - 1 sets - 15 reps  - Seated Hip Abduction with Resistance  - 2 x daily - 7 x weekly - 1 sets - 15 reps      Date: 11/20 11/22 11/28 11/30 12/5 12/7 12/11 12/14     Visit #: 1 2 3 4 5 6 7 8 9 10   Manuals                                                                 Neuro Re-Ed             EDU/ HEP EDU on HEP, proper water intake, and anatomy    RR RR  RR RR                                            Pause walks nv 1 lap           SLS nv 2x30"' ea 2x30" holds 2x30" holds    3x30" holdsFOAM 3x30" holds foam     DB x10 x10 HEP          DB+TAC x10 10x5" holds 10X5" holds    10x5" holds in standing      DB + TAC + fallouts x10 10x5" holds 10x5" holds          Hamstring stretch  3x30" holds 3x30" holds 3x30" holds 3x30" holds ea 3x30" holds ea        Cone walk overs/ taps          3 laps ea     Ther Ex             bike nv 10' 10' 10'         Nustep nv            treadmill     10' 1.5 mph 10' 1.5 mph 10' 1.9 mph  10' 4% elevation 1.5 mph     Hip ABD   15x5" holds 15x5" holds PTB  15x5" holdsGTB 15x5" holdsGTB      Hip ADD   15x5" holds 20x5" holds  15x5" holds 15X5" holds      Clam shells nv x15 x15          3 way hip nv x10 X15 ea X15 ea  X10 ea foam X15 ea foam X15 ea foam X15 ea foam     marches nv 2x10 X15 ea no HHA X15 ea 2# CW X15 ea 2.5# CW X20 On foam  X20 on foam X20 ea foam     Sled push pulls   2 laps 2 laps 3 laps 2 laps with addition of 20#       bridges   x10          Paloff press   X10 ea purple band X15 ea purple band  X15 ea yellow band X20 ea yellow band    Bosu ball    Chops and lifts @[employer]     Chops x10 ea PSI 4.0; lifts x10 ea PSI 4.0 Chops x10 ea PSI 6.1, lifts x10 ea PSI         Leg press    Plate 8 DL 84# x 20 Plate 8 DL 60# x 20, SL 55# x20 Plate 8 DL #46 J99, SL #55 x 20 Plate 8 DL #11 K19, SL #65 x 20      Ther Activity             [employer]             Box lifts      12.5# box lifts x10 from floor to waist height 22.5# box lifts x10 from floor to waist height 22.5# box lifts x10 from floor to waist height 32.5# box lifts x 10 from floor to waist > x10 rotation     Cone pick ups        X10 cones using lunge      Step ups    NV 8" step x10 ea  8" step x10 ea no HHA       Gait Training             Ambulation around the clinic                          Modalities

## 2023-12-19 ENCOUNTER — APPOINTMENT (OUTPATIENT)
Age: 58
End: 2023-12-19
Payer: COMMERCIAL

## 2023-12-19 ENCOUNTER — OFFICE VISIT (OUTPATIENT)
Age: 58
End: 2023-12-19
Payer: COMMERCIAL

## 2023-12-19 DIAGNOSIS — Z98.890 S/P LAMINECTOMY: Primary | ICD-10-CM

## 2023-12-19 PROCEDURE — 97112 NEUROMUSCULAR REEDUCATION: CPT

## 2023-12-19 PROCEDURE — 97110 THERAPEUTIC EXERCISES: CPT

## 2023-12-19 PROCEDURE — 97530 THERAPEUTIC ACTIVITIES: CPT

## 2023-12-19 NOTE — PROGRESS NOTES
Daily Note     Today's date: 2023  Patient name: Amauri Pereira  : 1965  MRN: [id number]  Referring provider: Kathrin Owens PA-C  Dx:   Encounter Diagnosis     ICD-10-CM    1. S/P laminectomy  Z98.890           Start Time:   Stop Time:   Total time in clinic (min): 45 minutes    Subjective: Pt reports that he returned to work on Monday and notes he has pain 3/10 over R PSIS. Pt reports he forgot to wear his SI belt because he was feeling pretty good which seems to have aggravated his symptoms. Pt continues to report compliance with HEP.       Objective: See treatment diary below      Assessment: Pt performed treadmill aerobic exercises to increase blood flow to the area being treated, prepare the muscles for strength training and stretching, improve overall tolerance to activity, and aerobic endurance.  Pt has continued to make excellent progress with addition of new exercises and increased weight/ repetitions. Pt only requires verbal cueing for form roughly 25% of the time. Pt demonstrates more mindfulness surrounding his posture, body mechanics, and core activation especially when lifting/pushing/pulling. Ended session with stretching to reduce tissue tension and improve flexibility. Pt continues to benefit from physical therapy in order to continue progressing towards goals as outlined and return to PLOF. Will continue to modify and advance current POC based on overall progress and symptom irritability.       Plan: Continue per plan of care.  Progress treatment as tolerated.       Precautions: Standard, Hx of low back pain, hernia repair, Hx pancreatic surgery     Access Code: [email or web address]  URL: [email or web address]  Date: 2023  Prepared by: Dina Valladares    Exercises  - Diaphragmatic Breathing with Pelvic Tilt in Hooklying  - 2 x daily - 7 x weekly - 1 sets - 10 reps - 5 sec hold  - Supine Transversus Abdominis Bracing - Hands on Stomach  - 2 x daily - 7 x weekly  "- 1 sets - 10 reps - 5 sec hold  - Supine Lower Trunk Rotation  - 2 x daily - 7 x weekly - 1 sets - 10 reps - 5 sec hold  - Seated Hamstring Stretch  - 2 x daily - 7 x weekly - 1 sets - 3 reps - 30 sec hold  - Clamshell  - 2 x daily - 7 x weekly - 1 sets - 15 reps  - Supine Bridge  - 2 x daily - 7 x weekly - 1 sets - 10 reps  - Standing March with Counter Support  - 2 x daily - 7 x weekly - 1 sets - 15 reps  - Standing Hip Flexion with Counter Support  - 2 x daily - 7 x weekly - 1 sets - 15 reps  - Standing Hip Abduction with Counter Support  - 2 x daily - 7 x weekly - 1 sets - 15 reps  - Standing Hip Extension with Counter Support  - 2 x daily - 7 x weekly - 1 sets - 15 reps  - Squat with Chair Touch  - 2 x daily - 7 x weekly - 1 sets - 10 reps  - Seated Hip Adduction Isometrics with Ball  - 2 x daily - 7 x weekly - 1 sets - 15 reps  - Seated Hip Abduction with Resistance  - 2 x daily - 7 x weekly - 1 sets - 15 reps      Date: 11/20 11/22 11/28 11/30 12/5 12/7 12/11 12/14 12/19    Visit #: 1 2 3 4 5 6 7 8 9 10   Manuals                                                                 Neuro Re-Ed             EDU/ HEP EDU on HEP, proper water intake, and anatomy    RR RR  RR RR RR                                           Pause walks nv 1 lap           SLS nv 2x30\"' ea 2x30\" holds 2x30\" holds    3x30\" holdsFOAM 3x30\" holds foam     DB x10 x10 HEP          DB+TAC x10 10x5\" holds 10X5\" holds    10x5\" holds in standing      DB + TAC + fallouts x10 10x5\" holds 10x5\" holds          Hamstring stretch  3x30\" holds 3x30\" holds 3x30\" holds 3x30\" holds ea 3x30\" holds ea    3x30\" holds ea at step    Calf stretch          3x30\" holds ea 1/2 foam roll    Seated ER stretch          X10     Seated wind mills         X10 ea    Cone walk overs/ taps          3 laps ea     Ther Ex             bike nv 10' 10' 10'         Nustep nv            treadmill     10' 1.5 mph 10' 1.5 mph 10' 1.9 mph  10' 4% elevation 1.5 mph 10' 4% elevation 1.5 " "mph    Hip ABD   15x5\" holds 15x5\" holds PTB  15x5\" holdsGTB 15x5\" holdsGTB      Hip ADD   15x5\" holds 20x5\" holds  15x5\" holds 15X5\" holds      Clam shells nv x15 x15          3 way hip nv x10 X15 ea X15 ea  X10 ea foam X15 ea foam X15 ea foam X15 ea foam X15 ea foam    marches nv 2x10 X15 ea no HHA X15 ea 2# CW X15 ea 2.5# CW X20 On foam  X20 on foam X20 ea foam     Sled push pulls   2 laps 2 laps 3 laps 2 laps with addition of 20#   2 laps with addition of 20#    bridges   x10          Paloff press   X10 ea purple band X15 ea purple band  X15 ea yellow band X20 ea yellow band    Bosu ball    Chops and lifts @[employer]     Chops x10 ea PSI 4.0; lifts x10 ea PSI 4.0 Chops x10 ea PSI 6.1, lifts x10 ea PSI         Leg press    Plate 8 DL 35# x 20 Plate 8 DL 55# x 20, SL 55# x20 Plate 8 DL #65 X20, SL #55 x 20 Plate 8 DL #75 X20, SL #65 x 20  Plate 8 DL #85 X20, SL #85 x 20    Ther Activity             [employer]             Box lifts      12.5# box lifts x10 from floor to waist height 22.5# box lifts x10 from floor to waist height 22.5# box lifts x10 from floor to waist height 32.5# box lifts x 10 from floor to waist > x10 rotation 32.5# box lifts x 10 from floor to waist > 42.5#x10 rotation    Cone pick ups        X10 cones using lunge      Step ups    NV 8\" step x10 ea  8\" step x10 ea no HHA       Gait Training             Ambulation around the clinic                          Modalities                                                          "

## 2023-12-21 ENCOUNTER — APPOINTMENT (OUTPATIENT)
Age: 58
End: 2023-12-21
Payer: COMMERCIAL

## 2023-12-27 ENCOUNTER — OFFICE VISIT (OUTPATIENT)
Age: 58
End: 2023-12-27
Payer: COMMERCIAL

## 2023-12-27 DIAGNOSIS — Z98.890 S/P LAMINECTOMY: Primary | ICD-10-CM

## 2023-12-27 PROCEDURE — 97530 THERAPEUTIC ACTIVITIES: CPT

## 2023-12-27 PROCEDURE — 97112 NEUROMUSCULAR REEDUCATION: CPT

## 2023-12-27 PROCEDURE — 97110 THERAPEUTIC EXERCISES: CPT

## 2023-12-27 NOTE — PROGRESS NOTES
Daily Note     Today's date: 2023  Patient name: Amauri Pereira  : 1965  MRN: [id number]  Referring provider: Kathrin Owens PA-C  Dx:   Encounter Diagnosis     ICD-10-CM    1. S/P laminectomy  Z98.890           Start Time: 174  Stop Time: 1830  Total time in clinic (min): 45 minutes    Subjective: Pt reports 3/10 along the R lumbar spine and radiates into the R glute. Pt reports he has his follow up appointment with his Surgeon on 1/3/23. Pt has continued to track his walking mileage averaging roughly 44838 steps per day. Pt reports pain has increased mildly due to returning to work from the holiday and weather changes. Pt continues to report compliance with HEP and denies any questions or concerns at this time.       Objective: See treatment diary below      Assessment: Pt performed treadmill aerobic exercises to increase blood flow to the area being treated, prepare the muscles for strength training and stretching, improve overall tolerance to activity, and aerobic endurance.  Pt continues to make excellent progress with current POC as outlined. When performing cone tap walk overs pt continues to have minimal difficulty but no lose of balance was observed. When performing the cone pick ups pt was able to self correct posture and avoid bending at the waist to prevent future injury of the low back. Pt fatigued appropriately throughout session and did not have an increase in symptoms. Pt continues to benefit from physical therapy in order to continue progressing towards goals as outlined and return to PLOF. Will continue to modify and advance current POC based on overall progress and symptom irritability.       Plan: Continue per plan of care.  Progress treatment as tolerated.       Precautions: Standard, Hx of low back pain, hernia repair, Hx pancreatic surgery     Access Code: [email or web address]  URL: [email or web address]  Date: 2023  Prepared by: Dina Singh  -  "Diaphragmatic Breathing with Pelvic Tilt in Hooklying  - 2 x daily - 7 x weekly - 1 sets - 10 reps - 5 sec hold  - Supine Transversus Abdominis Bracing - Hands on Stomach  - 2 x daily - 7 x weekly - 1 sets - 10 reps - 5 sec hold  - Supine Lower Trunk Rotation  - 2 x daily - 7 x weekly - 1 sets - 10 reps - 5 sec hold  - Seated Hamstring Stretch  - 2 x daily - 7 x weekly - 1 sets - 3 reps - 30 sec hold  - Clamshell  - 2 x daily - 7 x weekly - 1 sets - 15 reps  - Supine Bridge  - 2 x daily - 7 x weekly - 1 sets - 10 reps  - Standing March with Counter Support  - 2 x daily - 7 x weekly - 1 sets - 15 reps  - Standing Hip Flexion with Counter Support  - 2 x daily - 7 x weekly - 1 sets - 15 reps  - Standing Hip Abduction with Counter Support  - 2 x daily - 7 x weekly - 1 sets - 15 reps  - Standing Hip Extension with Counter Support  - 2 x daily - 7 x weekly - 1 sets - 15 reps  - Squat with Chair Touch  - 2 x daily - 7 x weekly - 1 sets - 10 reps  - Seated Hip Adduction Isometrics with Ball  - 2 x daily - 7 x weekly - 1 sets - 15 reps  - Seated Hip Abduction with Resistance  - 2 x daily - 7 x weekly - 1 sets - 15 reps      Date: 11/20 11/22 11/28 11/30 12/5 12/7 12/11 12/14 12/19 12/27   Visit #: 1 2 3 4 5 6 7 8 9 10   Manuals                                                                 Neuro Re-Ed             EDU/ HEP EDU on HEP, proper water intake, and anatomy    RR RR  RR RR RR RR                                          Pause walks nv 1 lap           SLS nv 2x30\"' ea 2x30\" holds 2x30\" holds    3x30\" holdsFOAM 3x30\" holds foam     DB x10 x10 HEP          DB+TAC x10 10x5\" holds 10X5\" holds    10x5\" holds in standing      DB + TAC + fallouts x10 10x5\" holds 10x5\" holds          Hamstring stretch  3x30\" holds 3x30\" holds 3x30\" holds 3x30\" holds ea 3x30\" holds ea    3x30\" holds ea at step 3x30\" holds ea   Calf stretch          3x30\" holds ea 1/2 foam roll    Piriformis stretch           3x30\" holds ea   Seated ER stretch " "         X10  x10   Seated wind mills         X10 ea X10 ea   Cone walk overs/ taps          3 laps ea     Ther Ex             bike nv 10' 10' 10'         Nustep nv            treadmill     10' 1.5 mph 10' 1.5 mph 10' 1.9 mph  10' 4% elevation 1.5 mph 10' 4% elevation 1.5 mph 6' 4.5% elevation at 1.5 mph > 4' 5.5% elevation at 1.7 mph   Hip ABD   15x5\" holds 15x5\" holds PTB  15x5\" holdsGTB 15x5\" holdsGTB      Hip ADD   15x5\" holds 20x5\" holds  15x5\" holds 15X5\" holds      Clam shells nv x15 x15          3 way hip nv x10 X15 ea X15 ea  X10 ea foam X15 ea foam X15 ea foam X15 ea foam X15 ea foam X15 ea foam   marches nv 2x10 X15 ea no HHA X15 ea 2# CW X15 ea 2.5# CW X20 On foam  X20 on foam X20 ea foam     Sled push pulls   2 laps 2 laps 3 laps 2 laps with addition of 20#   2 laps with addition of 20#    bridges   x10          Paloff press   X10 ea purple band X15 ea purple band  X15 ea yellow band X20 ea yellow band    Bosu ball    Chops and lifts @[employer]     Chops x10 ea PSI 4.0; lifts x10 ea PSI 4.0 Chops x10 ea PSI 6.1, lifts x10 ea PSI         Leg press    Plate 8 DL 35# x 20 Plate 8 DL 55# x 20, SL 55# x20 Plate 8 DL #65 X20, SL #55 x 20 Plate 8 DL #75 X20, SL #65 x 20  Plate 8 DL #85 X20, SL #85 x 20 Plate 8 DL #95 X20, SL #95 x 20   Mcgregor carries          3 laps 10#   Ther Activity             Biodex             Box lifts      12.5# box lifts x10 from floor to waist height 22.5# box lifts x10 from floor to waist height 22.5# box lifts x10 from floor to waist height 32.5# box lifts x 10 from floor to waist > x10 rotation 32.5# box lifts x 10 from floor to waist > 42.5#x10 rotation    Cone pick ups        X10 cones using lunge   X10 cones using lunge    Step ups    NV 8\" step x10 ea  8\" step x10 ea no HHA    8\" step x10 ea   Gait Training             Ambulation around the clinic                          Modalities                                                            "

## 2023-12-28 ENCOUNTER — EVALUATION (OUTPATIENT)
Age: 58
End: 2023-12-28
Payer: COMMERCIAL

## 2023-12-28 DIAGNOSIS — Z98.890 S/P LAMINECTOMY: Primary | ICD-10-CM

## 2023-12-28 PROCEDURE — 97110 THERAPEUTIC EXERCISES: CPT

## 2023-12-28 PROCEDURE — 97112 NEUROMUSCULAR REEDUCATION: CPT

## 2023-12-28 PROCEDURE — 97164 PT RE-EVAL EST PLAN CARE: CPT

## 2023-12-28 RX ORDER — TAMSULOSIN HYDROCHLORIDE 0.4 MG/1
0.4 CAPSULE ORAL
COMMUNITY

## 2023-12-28 NOTE — PROGRESS NOTES
PT Re-Evaluation     Today's date: 2023  Patient name: Amauri Pereira  : 1965  MRN: [id number]  Referring provider: Kathrin Owens PA-C  Dx:   Encounter Diagnosis     ICD-10-CM    1. S/P laminectomy  Z98.890             Start Time: 1745  Stop Time: 1830  Total time in clinic (min): 45 minutes    Assessment  Assessment details: Amauri is a 57 y.o. year old male who continues to present to outpatient physical therapy s/p laminectomy 2023. Amauri has made significant improvements with LE strength with greatest limitations into hip flexion and hip ADD. Pt has WFL for all directions for lumbar ROM with stiffness only noted in b/l hamstrings when performing forward flexion bend. Pt requires no verbal cueing from therapist to engage his core. Amauri is ambulating without an AD averaging roughly 10,000 steps a day without an increase in symptoms into the low back and R glute. Amauri is currently functioning below their prior level of function and is a good rehab candidate who would benefit from skilled outpatient physical therapy services to allow them to reach their goals and return to PLOF. Pt recommended for 1-2x a week for 2-3 more weeks. Pt prognosis for therapy is good. PT discussed POC and goals with patient, patient was agreeable.      Physical therapist assistant (PTA) may be utilized to administer treatments as appropriate and in accordance with [facility] Physical Therapy Practice Act.    Impairments: abnormal gait, abnormal or restricted ROM, abnormal movement, activity intolerance, impaired balance, impaired physical strength, lacks appropriate home exercise program, pain with function, weight-bearing intolerance, poor posture  and poor body mechanics  Understanding of Dx/Px/POC: good   Prognosis: good    Goals  STG: To be completed in 4 weeks from 23:   1. Amauri will report pain no worse than 1/10 at worst to indicate decreased pain level and improved tolerance to activity. (In  progress)  2. Amauri will demonstrate gross 4+/5 strength in BLE for improved stability of joint and indicate improvement in functional strength.  (MET)  3. Amauri will tolerate 30 minutes of continuous activity at a time with no increase in pain to indicate improved tolerance to activity. (MET)  4. Amauri will demonstrate ability to sustain a strong 10 second contraction in supine with minimal verbal cueing <50% of the time from therapist. (MET)  5. Amauri will be more mindful with sitting and standing posture requiring minimal verbal cueing <50% of the time from therapist to correct biomechanical alignment. (MET)  6. Amauri will be independent with basic HEP to allow pt to complete exercises safely when not directly supervised during PT session. (MET)    LTG: To be completed in 6-8 weeks from 11/20/23 or upon discharge from OPPT:  1. Amauri will report no pain to indicate decreased pain level and improved tolerance to activity. (In progress)  2. Amauri will report 75% improvement in symptoms compared to start of POC to indicate functional improvement and decrease level of disability.  (MET)  3. Amauri will tolerate 45+ minutes of consecutive activity at a time with no increase in symptoms to indicate improvement with functional mobility. (MET)  4. Amauri will demonstrate gross 5/5 strength in BLE for improved stability of joint and indicate improvement in functional strength. (In progress)  5. Amauri will be independent with advanced home exercise program to allow patient to transition from physical therapy care to continue with plan of care at home without supervision from therapist and continue to progress with rehabilitation. (In progress)  6. Amauri will tolerate single limb stance for 30 seconds on b/l leg to allow patient to have increased stance time on b/l leg during ambulation and increase tolerance to WBing. (In progress)    New goals to be met in 2-3 weeks from 12/28/23 or upon d/c from OPPT:  1. Amauri will be able to perform  a box lift of 50# from floor to waist height with proper body mechanics.  2. Amauri will be able to perform 10 cone pick ups in a lunge position without verbal cueing from therapist for avoidance of hinging at the waist.            Plan  Patient would benefit from: PT eval and skilled physical therapy  Planned modality interventions: unattended electrical stimulation, ultrasound, traction, biofeedback, cryotherapy and thermotherapy: hydrocollator packs  Planned therapy interventions: abdominal trunk stabilization, activity modification, ADL retraining, ADL training, balance, balance/weight bearing training, body mechanics training, breathing training, flexibility, functional ROM exercises, gait training, graded exercise, home exercise program, transfer training, therapeutic training, therapeutic exercise, therapeutic activities, stretching, strengthening, patient education, neuromuscular re-education, nerve gliding, [place] taping, massage, manual therapy, kinesiology taping, joint mobilization and IASTM  Frequency: 2x week  Duration in visits: 6  Duration in weeks: 3  Plan of Care beginning date: 12/28/2023  Plan of Care expiration date: 1/18/2024  Treatment plan discussed with: patient    Subjective Evaluation    History of Present Illness  Mechanism of injury: Since starting course of therapy pt reports being roughly 80% improved. Pt reports he has improvements in lumbar and LE flexibility. Pt reports he is able to tie his shoes more easily. Today he was able to perform yard work that included bending over following his full shift at work with minimal stiffness and soreness reported. Pt reports he is now able to consistently walking about 10,000 steps per day. Pt no longer ambulates with an AD. Pt reports he still has difficulty with standing in a prolonged positions which causes an increase in stiffness.        Patient Goals  Patient goals for therapy: improved balance, increased motion, increased strength and  "return to work  Patient goal: \"better motion\"(MET), \"return to lawn work\"(MET), \" improve strength including core strength\"(MET), \"return to all work responsibilities\"(MET)  Pain  Current pain ratin (R low back into the top of the R glute)  At best pain ratin  At worst pain ratin  Pain location: R buttock, low back.  Quality: dull ache and burning (spasms)  Relieving factors: ice and rest  Aggravating factors: standing, stair climbing and lifting  Progression: improved    Social Support  Steps to enter house: yes (3STE railing)  Interior stairs assessed: 1 flight of stairs with railing.   Lives in: multiple-level home  Lives with: spouse    Employment status: not working (disability with intent to return back to work with [employer])  Hand dominance: right    Treatments  Previous treatment: injection treatment, physical therapy, chiropractic and medication    Objective     Neurological Testing     Sensation     Lumbar   Left   Intact: light touch    Right   Intact: light touch    Reflexes   Left   Patellar (L4): normal (2+)  Achilles (S1): normal (2+)    Right   Patellar (L4): normal (2+)  Achilles (S1): normal (2+)    Active Range of Motion     Lumbar   Flexion:  WFL  Extension:  WFL  Left lateral flexion:  WFL  Right lateral flexion:  WFL  Left rotation:  WFL  Right rotation:  WFL    Strength/Myotome Testing     Left Hip   Planes of Motion   Flexion: 4+  Abduction: 5  Adduction: 4+    Right Hip   Planes of Motion   Flexion: 4+  Abduction: 5  Adduction: 4+    Left Knee   Flexion: 4+  Extension: 4+    Right Knee   Flexion: 4+  Extension: 4+    Left Ankle/Foot   Dorsiflexion: 5  Great toe extension: 5    Right Ankle/Foot   Dorsiflexion: 5  Great toe extension: 5             Precautions: Standard, Hx of low back pain, hernia repair, Hx pancreatic surgery     Access Code: [email or web address]  URL: [email or web address]  Date: 2023  Prepared by: Dina Valladares    Exercises  - Diaphragmatic Breathing " "with Pelvic Tilt in Hooklying  - 2 x daily - 7 x weekly - 1 sets - 10 reps - 5 sec hold  - Supine Transversus Abdominis Bracing - Hands on Stomach  - 2 x daily - 7 x weekly - 1 sets - 10 reps - 5 sec hold  - Supine Lower Trunk Rotation  - 2 x daily - 7 x weekly - 1 sets - 10 reps - 5 sec hold  - Seated Hamstring Stretch  - 2 x daily - 7 x weekly - 1 sets - 3 reps - 30 sec hold        Date: 12/28 12/7 12/11 12/14 12/19 12/27   Visit #: 11     6 7 8 9 10   Manuals                                                                 Neuro Re-Ed             EDU/ HEP RR      RR RR RR RR                                          Pause walks             SLS       3x30\" holdsFOAM 3x30\" holds foam     DB             DB+TAC       10x5\" holds in standing      DB + TAC + fallouts             Hamstring stretch          3x30\" holds ea at step 3x30\" holds ea   Calf stretch          3x30\" holds ea 1/2 foam roll    Piriformis stretch           3x30\" holds ea   Seated ER stretch          X10  x10   Seated wind mills         X10 ea X10 ea   Cone walk overs/ taps          3 laps ea     Ther Ex             bike             Nustep             treadmill 10' 5.5% elevation at 1.7 mph     10' 1.5 mph 10' 1.9 mph  10' 4% elevation 1.5 mph 10' 4% elevation 1.5 mph 6' 4.5% elevation at 1.5 mph > 4' 5.5% elevation at 1.7 mph   Hip ABD      15x5\" holdsGTB 15x5\" holdsGTB      Hip ADD      15x5\" holds 15X5\" holds      Clam shells             3 way hip      X15 ea foam X15 ea foam X15 ea foam X15 ea foam X15 ea foam   marches      X20 On foam  X20 on foam X20 ea foam     Sled push pulls 2 laps with addition of 20#     2 laps with addition of 20#   2 laps with addition of 20#    bridges             Paloff press      X20 ea yellow band    Bosu ball    Chops and lifts @[employer]             Leg press Plate 8 DL #95 x20, SL #95 x20     Plate 8 DL #65 X20, SL #55 x 20 Plate 8 DL #75 X20, SL #65 x 20  Plate 8 DL #85 X20, SL #85 x 20 Plate 8 DL #95 X20, SL #95 " "x 20   Mcgregor carries          3 laps 10#   Ther Activity             [employer]             Box lifts  42.5# box lifts from floor to waist     22.5# box lifts x10 from floor to waist height 22.5# box lifts x10 from floor to waist height 32.5# box lifts x 10 from floor to waist > x10 rotation 32.5# box lifts x 10 from floor to waist > 42.5#x10 rotation    Cone pick ups        X10 cones using lunge   X10 cones using lunge    Step ups      8\" step x10 ea no HHA    8\" step x10 ea   Gait Training             Ambulation around the clinic                          Modalities                                            "

## 2023-12-28 NOTE — LETTER
2023    Kathrin Owens PA-C  [address] [facility] [address]    Patient: Amauri Pereira   YOB: 1965   Date of Visit: 2023     Encounter Diagnosis     ICD-10-CM    1. S/P laminectomy  Z98.890           Dear Dr. Owens:    Thank you for your recent referral of Amauri Pereira. Please review the attached evaluation summary from Amauri's recent visit.     Please verify that you agree with the plan of care by signing the attached order.     If you have any questions or concerns, please do not hesitate to call.     I sincerely appreciate the opportunity to share in the care of one of your patients and hope to have another opportunity to work with you in the near future.       Sincerely,    Dina Valladares, PT      Referring Provider:      I certify that I have read the below Plan of Care and certify the need for these services furnished under this plan of treatment while under my care.                    Kathrin Owens PA-C  [address] [facility] [address]  Via Fax: [phone]          PT Re-Evaluation     Today's date: 2023  Patient name: Amauri Pereira  : 1965  MRN: [id number]  Referring provider: Kathrin Owens PA-C  Dx:   Encounter Diagnosis     ICD-10-CM    1. S/P laminectomy  Z98.890             Start Time: 1745  Stop Time: 1830  Total time in clinic (min): 45 minutes    Assessment  Assessment details: Amauri is a 57 y.o. year old male who continues to present to outpatient physical therapy s/p laminectomy 2023. Amauri has made significant improvements with LE strength with greatest limitations into hip flexion and hip ADD. Pt has WFL for all directions for lumbar ROM with stiffness only noted in b/l hamstrings when performing forward flexion bend. Pt requires no verbal cueing from therapist to engage his core. Amauri is ambulating without an AD averaging roughly 10,000 steps a day without an increase in symptoms into the low back and R glute.  Amauri is currently functioning below their prior level of function and is a good rehab candidate who would benefit from skilled outpatient physical therapy services to allow them to reach their goals and return to PLOF. Pt recommended for 1-2x a week for 2-3 more weeks. Pt prognosis for therapy is good. PT discussed POC and goals with patient, patient was agreeable.      Physical therapist assistant (PTA) may be utilized to administer treatments as appropriate and in accordance with [facility] Physical Therapy Practice Act.    Impairments: abnormal gait, abnormal or restricted ROM, abnormal movement, activity intolerance, impaired balance, impaired physical strength, lacks appropriate home exercise program, pain with function, weight-bearing intolerance, poor posture  and poor body mechanics  Understanding of Dx/Px/POC: good   Prognosis: good    Goals  STG: To be completed in 4 weeks from 11/20/23:   1. Amauri will report pain no worse than 1/10 at worst to indicate decreased pain level and improved tolerance to activity. (In progress)  2. Amauri will demonstrate gross 4+/5 strength in BLE for improved stability of joint and indicate improvement in functional strength.  (MET)  3. Amauri will tolerate 30 minutes of continuous activity at a time with no increase in pain to indicate improved tolerance to activity. (MET)  4. Amauri will demonstrate ability to sustain a strong 10 second contraction in supine with minimal verbal cueing <50% of the time from therapist. (MET)  5. Amauri will be more mindful with sitting and standing posture requiring minimal verbal cueing <50% of the time from therapist to correct biomechanical alignment. (MET)  6. Amauri will be independent with basic HEP to allow pt to complete exercises safely when not directly supervised during PT session. (MET)    LTG: To be completed in 6-8 weeks from 11/20/23 or upon discharge from OPPT:  1. Amauri will report no pain to indicate decreased pain level and  improved tolerance to activity. (In progress)  2. Amauri will report 75% improvement in symptoms compared to start of POC to indicate functional improvement and decrease level of disability.  (MET)  3. Amauri will tolerate 45+ minutes of consecutive activity at a time with no increase in symptoms to indicate improvement with functional mobility. (MET)  4. Amauri will demonstrate gross 5/5 strength in BLE for improved stability of joint and indicate improvement in functional strength. (In progress)  5. Amauri will be independent with advanced home exercise program to allow patient to transition from physical therapy care to continue with plan of care at home without supervision from therapist and continue to progress with rehabilitation. (In progress)  6. Amauri will tolerate single limb stance for 30 seconds on b/l leg to allow patient to have increased stance time on b/l leg during ambulation and increase tolerance to WBing. (In progress)    New goals to be met in 2-3 weeks from 12/28/23 or upon d/c from OPPT:  1. Amauri will be able to perform a box lift of 50# from floor to waist height with proper body mechanics.  2. Amauri will be able to perform 10 cone pick ups in a lunge position without verbal cueing from therapist for avoidance of hinging at the waist.            Plan  Patient would benefit from: PT eval and skilled physical therapy  Planned modality interventions: unattended electrical stimulation, ultrasound, traction, biofeedback, cryotherapy and thermotherapy: hydrocollator packs  Planned therapy interventions: abdominal trunk stabilization, activity modification, ADL retraining, ADL training, balance, balance/weight bearing training, body mechanics training, breathing training, flexibility, functional ROM exercises, gait training, graded exercise, home exercise program, transfer training, therapeutic training, therapeutic exercise, therapeutic activities, stretching, strengthening, patient education, neuromuscular  "re-education, nerve gliding, [place] taping, massage, manual therapy, kinesiology taping, joint mobilization and IASTM  Frequency: 2x week  Duration in visits: 6  Duration in weeks: 3  Plan of Care beginning date: 2023  Plan of Care expiration date: 2024  Treatment plan discussed with: patient    Subjective Evaluation    History of Present Illness  Mechanism of injury: Since starting course of therapy pt reports being roughly 80% improved. Pt reports he has improvements in lumbar and LE flexibility. Pt reports he is able to tie his shoes more easily. Today he was able to perform yard work that included bending over following his full shift at work with minimal stiffness and soreness reported. Pt reports he is now able to consistently walking about 10,000 steps per day. Pt no longer ambulates with an AD. Pt reports he still has difficulty with standing in a prolonged positions which causes an increase in stiffness.        Patient Goals  Patient goals for therapy: improved balance, increased motion, increased strength and return to work  Patient goal: \"better motion\"(MET), \"return to lawn work\"(MET), \" improve strength including core strength\"(MET), \"return to all work responsibilities\"(MET)  Pain  Current pain ratin (R low back into the top of the R glute)  At best pain ratin  At worst pain ratin  Pain location: R buttock, low back.  Quality: dull ache and burning (spasms)  Relieving factors: ice and rest  Aggravating factors: standing, stair climbing and lifting  Progression: improved    Social Support  Steps to enter house: yes (3STE railing)  Interior stairs assessed: 1 flight of stairs with railing.   Lives in: multiple-level home  Lives with: spouse    Employment status: not working (disability with intent to return back to work with [employer])  Hand dominance: right    Treatments  Previous treatment: injection treatment, physical therapy, chiropractic and medication    Objective " "    Neurological Testing     Sensation     Lumbar   Left   Intact: light touch    Right   Intact: light touch    Reflexes   Left   Patellar (L4): normal (2+)  Achilles (S1): normal (2+)    Right   Patellar (L4): normal (2+)  Achilles (S1): normal (2+)    Active Range of Motion     Lumbar   Flexion:  WFL  Extension:  WFL  Left lateral flexion:  WFL  Right lateral flexion:  WFL  Left rotation:  WFL  Right rotation:  WFL    Strength/Myotome Testing     Left Hip   Planes of Motion   Flexion: 4+  Abduction: 5  Adduction: 4+    Right Hip   Planes of Motion   Flexion: 4+  Abduction: 5  Adduction: 4+    Left Knee   Flexion: 4+  Extension: 4+    Right Knee   Flexion: 4+  Extension: 4+    Left Ankle/Foot   Dorsiflexion: 5  Great toe extension: 5    Right Ankle/Foot   Dorsiflexion: 5  Great toe extension: 5             Precautions: Standard, Hx of low back pain, hernia repair, Hx pancreatic surgery     Access Code: [email or web address]  URL: [email or web address]  Date: 2023  Prepared by: Dina Valladares    Exercises  - Diaphragmatic Breathing with Pelvic Tilt in Hooklying  - 2 x daily - 7 x weekly - 1 sets - 10 reps - 5 sec hold  - Supine Transversus Abdominis Bracing - Hands on Stomach  - 2 x daily - 7 x weekly - 1 sets - 10 reps - 5 sec hold  - Supine Lower Trunk Rotation  - 2 x daily - 7 x weekly - 1 sets - 10 reps - 5 sec hold  - Seated Hamstring Stretch  - 2 x daily - 7 x weekly - 1 sets - 3 reps - 30 sec hold        Date:    Visit #: 11     6 7 8 9 10   Manuals                                                                 Neuro Re-Ed             EDU/ HEP RR      RR RR RR RR                                          Pause walks             SLS       3x30\" holdsFOAM 3x30\" holds foam     DB             DB+TAC       10x5\" holds in standing      DB + TAC + fallouts             Hamstring stretch          3x30\" holds ea at step 3x30\" holds ea   Calf stretch          3x30\" holds " "ea 1/2 foam roll    Piriformis stretch           3x30\" holds ea   Seated ER stretch          X10  x10   Seated wind mills         X10 ea X10 ea   Cone walk overs/ taps          3 laps ea     Ther Ex             bike             Nustep             treadmill 10' 5.5% elevation at 1.7 mph     10' 1.5 mph 10' 1.9 mph  10' 4% elevation 1.5 mph 10' 4% elevation 1.5 mph 6' 4.5% elevation at 1.5 mph > 4' 5.5% elevation at 1.7 mph   Hip ABD      15x5\" holdsGTB 15x5\" holdsGTB      Hip ADD      15x5\" holds 15X5\" holds      Clam shells             3 way hip      X15 ea foam X15 ea foam X15 ea foam X15 ea foam X15 ea foam   marches      X20 On foam  X20 on foam X20 ea foam     Sled push pulls 2 laps with addition of 20#     2 laps with addition of 20#   2 laps with addition of 20#    bridges             Paloff press      X20 ea yellow band    Bosu ball    Chops and lifts @[place]             Leg press Plate 8 DL #95 x20, SL #95 x20     Plate 8 DL #65 X20, SL #55 x 20 Plate 8 DL #75 X20, SL #65 x 20  Plate 8 DL #85 X20, SL #85 x 20 Plate 8 DL #95 X20, SL #95 x 20   Mcgregor carries          3 laps 10#   Ther Activity             Biodex             Box lifts  42.5# box lifts from floor to waist     22.5# box lifts x10 from floor to waist height 22.5# box lifts x10 from floor to waist height 32.5# box lifts x 10 from floor to waist > x10 rotation 32.5# box lifts x 10 from floor to waist > 42.5#x10 rotation    Cone pick ups        X10 cones using lunge   X10 cones using lunge    Step ups      8\" step x10 ea no HHA    8\" step x10 ea   Gait Training             Ambulation around the clinic                          Modalities                                                             "

## 2024-01-03 ENCOUNTER — OFFICE VISIT (OUTPATIENT)
Age: 59
End: 2024-01-03
Payer: COMMERCIAL

## 2024-01-03 DIAGNOSIS — Z98.890 S/P LAMINECTOMY: Primary | ICD-10-CM

## 2024-01-03 PROCEDURE — 97110 THERAPEUTIC EXERCISES: CPT

## 2024-01-03 PROCEDURE — 97530 THERAPEUTIC ACTIVITIES: CPT

## 2024-01-03 PROCEDURE — 97112 NEUROMUSCULAR REEDUCATION: CPT

## 2024-01-03 NOTE — PROGRESS NOTES
Daily Note/ Discharge Note    Today's date: 1/3/2024  Patient name: Amauri Pereira  : 1965  MRN: [id number]  Referring provider: Kathrin Owens PA-C  Dx:   Encounter Diagnosis     ICD-10-CM    1. S/P laminectomy  Z98.890           Start Time: 1615  Stop Time: 1700  Total time in clinic (min): 45 minutes    Subjective: Pt reports that today he has 1/10 pain in the low back with slight radiating symptoms into the R glute. Pt has continued to average about 10,000 steps per day. Pt continues to report compliance with HEP and denies any questions or concerns at this time.      Objective: See treatment diary below      Assessment: Pt performed treadmill aerobic exercises to increase blood flow to the area being treated, prepare the muscles for strength training and stretching, improve overall tolerance to activity, and aerobic endurance. Pt has made excellent progress with POC as outlined and is appropriate for discharge to home base on progress/ management of symptoms. Pt demonstrated mindfulness throughout entire session regarding posture, safe biomechanics of lifting, and core activation with all exercises to prevent strain on the low back and prevent future injury. Pt fatigued appropriately throughout the session without an increase in symptoms. Pt was agreeable with discharge to HEP.     Plan:  Discharge to HEP.      Precautions: Standard, Hx of low back pain, hernia repair, Hx pancreatic surgery     Access Code: [email or web address]  URL: [email or web address]  Date: 2023  Prepared by: Dina Valladares    Exercises  - Diaphragmatic Breathing with Pelvic Tilt in Hooklying  - 2 x daily - 7 x weekly - 1 sets - 10 reps - 5 sec hold  - Supine Transversus Abdominis Bracing - Hands on Stomach  - 2 x daily - 7 x weekly - 1 sets - 10 reps - 5 sec hold  - Supine Lower Trunk Rotation  - 2 x daily - 7 x weekly - 1 sets - 10 reps - 5 sec hold  - Seated Hamstring Stretch  - 2 x daily - 7 x weekly - 1 sets -  "3 reps - 30 sec hold        Date: 12/28 1/3    12/7 12/11 12/14 12/19 12/27   Visit #: 11 12    6 7 8 9 10   Manuals                                                                 Neuro Re-Ed             EDU/ HEP RR RR     RR RR RR RR                                          Pause walks             SLS       3x30\" holdsFOAM 3x30\" holds foam     DB             DB+TAC       10x5\" holds in standing      DB + TAC + fallouts             Hamstring stretch   3x30\" holds ea       3x30\" holds ea at step 3x30\" holds ea   Calf stretch          3x30\" holds ea 1/2 foam roll    Piriformis stretch   3x30\" holds        3x30\" holds ea   Seated ER stretch   x10       X10  x10   Seated wind mills  X10 ea       X10 ea X10 ea   Cone walk overs/ taps   5 laps ea       3 laps ea     Ther Ex             bike             Nustep             treadmill 10' 5.5% elevation at 1.7 mph 10' 5.5% elevation at 1.7 mph    10' 1.5 mph 10' 1.9 mph  10' 4% elevation 1.5 mph 10' 4% elevation 1.5 mph 6' 4.5% elevation at 1.5 mph > 4' 5.5% elevation at 1.7 mph   Hip ABD      15x5\" holdsGTB 15x5\" holdsGTB      Hip ADD      15x5\" holds 15X5\" holds      Clam shells               3 way hip      X15 ea foam X15 ea foam X15 ea foam X15 ea foam X15 ea foam   marches      X20 On foam  X20 on foam X20 ea foam     Sled push pulls 2 laps with addition of 20#     2 laps with addition of 20#   2 laps with addition of 20#    bridges             Paloff press      X20 ea yellow band    Bosu ball    Chops and lifts @[place]             Leg press Plate 8 DL #95 x20, SL #95 x20 Plate 8 DL #95 x20, SL #95 x20    Plate 8 DL #65 X20, SL #55 x 20 Plate 8 DL #75 X20, SL #65 x 20  Plate 8 DL #85 X20, SL #85 x 20 Plate 8 DL #95 X20, SL #95 x 20   Mcgregor carries  3 laps 12#        3 laps 10#   Ther Activity             Biodex             Box lifts  42.5# box lifts from floor to waist 42.5# box lifts from floor to waist >42.5#x10 rotation    22.5# box lifts x10 from floor to waist " "height 22.5# box lifts x10 from floor to waist height 32.5# box lifts x 10 from floor to waist > x10 rotation 32.5# box lifts x 10 from floor to waist > 42.5#x10 rotation    Cone pick ups  X10 cones with lunge      X10 cones using lunge   X10 cones using lunge    Step ups      8\" step x10 ea no HHA    8\" step x10 ea   Gait Training             Ambulation around the clinic                          Modalities                                            "

## 2024-01-03 NOTE — LETTER
January 3, 2024    Kathrin Owens PA-C  [address] [place] [id number]    Patient: Amauri Pereira   YOB: 1965   Date of Visit: 1/3/2024     Encounter Diagnosis     ICD-10-CM    1. S/P laminectomy  Z98.890           Dear Dr. Owens:    Thank you for your recent referral of Amauri Pereira. Please review the attached evaluation summary from Amauri's recent visit.     Please verify that you agree with the plan of care by signing the attached order.     If you have any questions or concerns, please do not hesitate to call.     I sincerely appreciate the opportunity to share in the care of one of your patients and hope to have another opportunity to work with you in the near future.       Sincerely,    Dina Valladares, PT      Referring Provider:      I certify that I have read the below Plan of Care and certify the need for these services furnished under this plan of treatment while under my care.                    Kathrin Owens PA-C  [address] [facility] [address]  Via Fax: [phone]          Daily Note/ Discharge Note    Today's date: 1/3/2024  Patient name: Amauri Pereira  : 1965  MRN: [id number]  Referring provider: Kathrin Owens PA-C  Dx:   Encounter Diagnosis     ICD-10-CM    1. S/P laminectomy  Z98.890           Start Time: 1615  Stop Time: 1700  Total time in clinic (min): 45 minutes    Subjective: Pt reports that today he has 1/10 pain in the low back with slight radiating symptoms into the R glute. Pt has continued to average about 10,000 steps per day. Pt continues to report compliance with HEP and denies any questions or concerns at this time.      Objective: See treatment diary below      Assessment: Pt performed treadmill aerobic exercises to increase blood flow to the area being treated, prepare the muscles for strength training and stretching, improve overall tolerance to activity, and aerobic endurance. Pt has made excellent progress with POC as  "outlined and is appropriate for discharge to home base on progress/ management of symptoms. Pt demonstrated mindfulness throughout entire session regarding posture, safe biomechanics of lifting, and core activation with all exercises to prevent strain on the low back and prevent future injury. Pt fatigued appropriately throughout the session without an increase in symptoms. Pt was agreeable with discharge to [facility].     Plan:  Discharge to [facility].      Precautions: Standard, Hx of low back pain, hernia repair, Hx pancreatic surgery     Access Code: [email or web address]  URL: [email or web address]  Date: 2023  Prepared by: Dina Valladares    Exercises  - Diaphragmatic Breathing with Pelvic Tilt in Hooklying  - 2 x daily - 7 x weekly - 1 sets - 10 reps - 5 sec hold  - Supine Transversus Abdominis Bracing - Hands on Stomach  - 2 x daily - 7 x weekly - 1 sets - 10 reps - 5 sec hold  - Supine Lower Trunk Rotation  - 2 x daily - 7 x weekly - 1 sets - 10 reps - 5 sec hold  - Seated Hamstring Stretch  - 2 x daily - 7 x weekly - 1 sets - 3 reps - 30 sec hold        Date: 12/28 1/3    12/7 12/11 12/14 12/19 12/27   Visit #: 11 12    6 7 8 9 10   Manuals                                                                 Neuro Re-Ed             [facility]/ HEP RR RR     RR RR RR RR                                          Pause walks             SLS       3x30\" holdsFOAM 3x30\" holds foam     DB             DB+TAC       10x5\" holds in standing      DB + TAC + fallouts             Hamstring stretch   3x30\" holds ea       3x30\" holds ea at step 3x30\" holds ea   Calf stretch          3x30\" holds ea 1/2 foam roll    Piriformis stretch   3x30\" holds        3x30\" holds ea   Seated ER stretch   x10       X10  x10   Seated wind mills  X10 ea       X10 ea X10 ea   Cone walk overs/ taps   5 laps ea       3 laps ea     Ther Ex             bike             Nustep             treadmill 10' 5.5% elevation at 1.7 mph 10' 5.5% elevation at 1.7 mph    " "10' 1.5 mph 10' 1.9 mph  10' 4% elevation 1.5 mph 10' 4% elevation 1.5 mph 6' 4.5% elevation at 1.5 mph > 4' 5.5% elevation at 1.7 mph   Hip ABD      15x5\" holdsGTB 15x5\" holdsGTB      Hip ADD      15x5\" holds 15X5\" holds      Clam shells               3 way hip      X15 ea foam X15 ea foam X15 ea foam X15 ea foam X15 ea foam   marches      X20 On foam  X20 on foam X20 ea foam     Sled push pulls 2 laps with addition of 20#     2 laps with addition of 20#   2 laps with addition of 20#    bridges             Paloff press      X20 ea yellow band    Bosu ball    Chops and lifts @[place]             Leg press Plate 8 DL #95 x20, SL #95 x20 Plate 8 DL #95 x20, SL #95 x20    Plate 8 DL #65 X20, SL #55 x 20 Plate 8 DL #75 X20, SL #65 x 20  Plate 8 DL #85 X20, SL #85 x 20 Plate 8 DL #95 X20, SL #95 x 20   Mcgregor carries  3 laps 12#        3 laps 10#   Ther Activity             [employer]             Box lifts  42.5# box lifts from floor to waist 42.5# box lifts from floor to waist >42.5#x10 rotation    22.5# box lifts x10 from floor to waist height 22.5# box lifts x10 from floor to waist height 32.5# box lifts x 10 from floor to waist > x10 rotation 32.5# box lifts x 10 from floor to waist > 42.5#x10 rotation    Cone pick ups  X10 cones with lunge      X10 cones using lunge   X10 cones using lunge    Step ups      8\" step x10 ea no HHA    8\" step x10 ea   Gait Training             Ambulation around the clinic                          Modalities                                                            "

## 2024-01-08 ENCOUNTER — APPOINTMENT (OUTPATIENT)
Age: 59
End: 2024-01-08
Payer: COMMERCIAL

## 2024-01-11 ENCOUNTER — APPOINTMENT (OUTPATIENT)
Age: 59
End: 2024-01-11
Payer: COMMERCIAL

## 2024-02-27 ENCOUNTER — OFFICE VISIT (OUTPATIENT)
Age: 59
End: 2024-02-27
Payer: COMMERCIAL

## 2024-02-27 VITALS
OXYGEN SATURATION: 96 % | RESPIRATION RATE: 16 BRPM | SYSTOLIC BLOOD PRESSURE: 145 MMHG | BODY MASS INDEX: 28.72 KG/M2 | HEIGHT: 70 IN | TEMPERATURE: 96 F | DIASTOLIC BLOOD PRESSURE: 89 MMHG | HEART RATE: 62 BPM | WEIGHT: 200.62 LBS

## 2024-02-27 DIAGNOSIS — J34.89 NASAL DISCHARGE WITH WATERY EYES: ICD-10-CM

## 2024-02-27 DIAGNOSIS — J01.90 ACUTE NON-RECURRENT SINUSITIS, UNSPECIFIED LOCATION: Primary | ICD-10-CM

## 2024-02-27 DIAGNOSIS — H04.209 NASAL DISCHARGE WITH WATERY EYES: ICD-10-CM

## 2024-02-27 PROCEDURE — 99213 OFFICE O/P EST LOW 20 MIN: CPT | Performed by: PHYSICIAN ASSISTANT

## 2024-02-27 RX ORDER — TIZANIDINE 4 MG/1
TABLET ORAL
COMMUNITY

## 2024-02-27 RX ORDER — AZITHROMYCIN 250 MG/1
TABLET, FILM COATED ORAL
Qty: 6 TABLET | Refills: 0 | Status: SHIPPED | OUTPATIENT
Start: 2024-02-27 | End: 2024-03-02

## 2024-02-27 RX ORDER — AZELASTINE HYDROCHLORIDE 0.5 MG/ML
1 SOLUTION/ DROPS OPHTHALMIC 2 TIMES DAILY PRN
Qty: 6 ML | Refills: 0 | Status: SHIPPED | OUTPATIENT
Start: 2024-02-27

## 2024-02-27 RX ORDER — FLUTICASONE PROPIONATE 50 MCG
2 SPRAY, SUSPENSION (ML) NASAL DAILY
Qty: 16 G | Refills: 0 | Status: SHIPPED | OUTPATIENT
Start: 2024-02-27

## 2024-02-27 RX ORDER — TAMSULOSIN HYDROCHLORIDE 0.4 MG/1
0.4 CAPSULE ORAL DAILY
COMMUNITY
Start: 2023-11-06

## 2024-02-27 NOTE — LETTER
February 27, 2024     Patient: Amauri Pereira   YOB: 1965   Date of Visit: 2/27/2024       To Whom it May Concern:    Amauri Pereira was seen in my clinic on 2/27/2024. He may return to work on 2/28/2024 .    If you have any questions or concerns, please don't hesitate to call.         Sincerely,          Juan Manuel Watt PA-C        CC: No Recipients

## 2024-02-27 NOTE — PROGRESS NOTES
[facility]        NAME: Amauri Pereira is a 58 y.o. male  : 1965    MRN: [id number]  DATE: 2024  TIME: 5:12 PM    Assessment and Plan   Acute non-recurrent sinusitis, unspecified location [J01.90]  1. Acute non-recurrent sinusitis, unspecified location  fluticasone (FLONASE) 50 mcg/act nasal spray    azithromycin (ZITHROMAX) 250 mg tablet      2. Nasal discharge with watery eyes  azelastine (OPTIVAR) 0.05 % ophthalmic solution            Patient Instructions     Discussed condition with pt. He/she has acute sinusitis which I will treat with an oral abx, Flonase and rec hydration, rest, discussed OTC cough/cold meds, and observation.  I also prescribed him Optivar eyedrops for eye watering and irritation.    Follow up with PCP in 3-5 days.  Proceed to  ER if symptoms worsen.    Chief Complaint     Chief Complaint   Patient presents with    Cold Like Symptoms     Since Friday. Sinus congestion, slight cough, runny nose, denies ear involvement. Eyes are red itchy and watery. Taking mucinex.          History of Present Illness       Patient presents with several day history of runny nose, nasal/sinus congestion, pain and pressure in his forehead, PND, intermittent cough, also has some itching and watering from his eyes.  Denies fever, chills, shortness of breath or wheezing, NVD, recent COVID exposure.  He has taken Mucinex for symptoms.        Review of Systems   Review of Systems   Constitutional: Negative.    HENT:  Positive for congestion, postnasal drip, rhinorrhea, sinus pressure and sinus pain.    Eyes:  Positive for discharge (watering), redness and itching.        Pertinent positives are bilateral.   Respiratory:  Positive for cough. Negative for shortness of breath and wheezing.    Cardiovascular: Negative.    Gastrointestinal: Negative.    Genitourinary: Negative.    Neurological:  Positive for headaches.         Current Medications       Current Outpatient Medications:      atorvastatin (LIPITOR) 10 mg tablet, Take 10 mg by mouth daily, Disp: , Rfl:     azelastine (OPTIVAR) 0.05 % ophthalmic solution, Administer 1 drop to both eyes 2 (two) times a day as needed (watery eyes), Disp: 6 mL, Rfl: 0    azithromycin (ZITHROMAX) 250 mg tablet, Take 2 tablets today then 1 tablet daily x 4 days, Disp: 6 tablet, Rfl: 0    fluticasone (FLONASE) 50 mcg/act nasal spray, 2 sprays into each nostril daily, Disp: 16 g, Rfl: 0    tamsulosin (FLOMAX) 0.4 mg, Take 0.4 mg by mouth daily, Disp: , Rfl:     tiZANidine (ZANAFLEX) 4 mg tablet, Take by mouth, Disp: , Rfl:     baclofen 20 mg tablet, Take 20 mg by mouth 3 (three) times a day, Disp: , Rfl:     celecoxib (CeleBREX) 100 mg capsule, Take 100 mg by mouth 2 (two) times a day, Disp: , Rfl:     cyclobenzaprine (FLEXERIL) 10 mg tablet, Take 10 mg by mouth 3 (three) times a day as needed for muscle spasms, Disp: , Rfl:     tamsulosin (Flomax) 0.4 mg, Take 0.4 mg by mouth daily with dinner, Disp: , Rfl:     Current Allergies     Allergies as of 02/27/2024 - Reviewed 02/27/2024   Allergen Reaction Noted    5ht3 receptor antagonists Other (See Comments) 11/23/2015    Sertraline Other (See Comments) 08/10/2017    Tramadol Other (See Comments) 08/10/2017    Trazodone Other (See Comments) 10/02/2012    Buspirone Dizziness and Nausea Only 05/24/2016    Iodinated contrast media Headache 05/27/2018    Glipizide Diarrhea 12/19/2023    Metformin Diarrhea 08/21/2023    Nortriptyline Other (See Comments) 06/07/2016    Penicillin g benzathine Hives 04/17/2023    Quetiapine Other (See Comments) 08/10/2017    Prednisone Other (See Comments) 12/26/2017            The following portions of the patient's history were reviewed and updated as appropriate: allergies, current medications, past family history, past medical history, past social history, past surgical history and problem list.     Past Medical History:   Diagnosis Date    Diabetes mellitus (HCC)     High cholesterol  "       Past Surgical History:   Procedure Laterality Date    BACK SURGERY      HERNIA REPAIR      SPLENECTOMY N/A 2008       Family History   Problem Relation Age of Onset    Diabetes Mother     Diabetes Father          Medications have been verified.        Objective   /89   Pulse 62   Temp (!) 96 °F (35.6 °C)   Resp 16   Ht 5' 10\" (1.778 m)   Wt 91 kg (200 lb 9.9 oz)   SpO2 96%   BMI 28.79 kg/m²   No LMP for male patient.       Physical Exam     Physical Exam  Vitals reviewed.   Constitutional:       General: He is not in acute distress.     Appearance: He is well-developed.   HENT:      Right Ear: Hearing, tympanic membrane, ear canal and external ear normal.      Left Ear: Hearing, tympanic membrane, ear canal and external ear normal.      Nose: Mucosal edema (B/L boggy turbinates) and congestion present.      Right Sinus: Frontal sinus tenderness present. No maxillary sinus tenderness.      Left Sinus: Frontal sinus tenderness present. No maxillary sinus tenderness.      Mouth/Throat:      Mouth: Mucous membranes are moist.      Pharynx: Posterior oropharyngeal erythema (PND) present. No oropharyngeal exudate.      Tonsils: No tonsillar exudate.   Eyes:      General:         Right eye: No discharge.         Left eye: No discharge.      Conjunctiva/sclera: Conjunctivae normal.   Cardiovascular:      Rate and Rhythm: Normal rate and regular rhythm.      Heart sounds: Normal heart sounds. No murmur heard.  Pulmonary:      Effort: Pulmonary effort is normal. No respiratory distress.      Breath sounds: Normal breath sounds.   Musculoskeletal:      Cervical back: Neck supple.   Lymphadenopathy:      Cervical: No cervical adenopathy.   Neurological:      Mental Status: He is alert and oriented to person, place, and time.                   "

## 2025-08-20 ENCOUNTER — OFFICE VISIT (OUTPATIENT)
Age: 60
End: 2025-08-20
Payer: COMMERCIAL

## 2025-08-20 VITALS
HEART RATE: 72 BPM | OXYGEN SATURATION: 99 % | RESPIRATION RATE: 18 BRPM | SYSTOLIC BLOOD PRESSURE: 120 MMHG | DIASTOLIC BLOOD PRESSURE: 78 MMHG | TEMPERATURE: 97.9 F | WEIGHT: 189.6 LBS | BODY MASS INDEX: 27.14 KG/M2 | HEIGHT: 70 IN

## 2025-08-20 DIAGNOSIS — H60.333 ACUTE SWIMMER'S EAR OF BOTH SIDES: Primary | ICD-10-CM

## 2025-08-20 PROCEDURE — G0382 LEV 3 HOSP TYPE B ED VISIT: HCPCS | Performed by: EMERGENCY MEDICINE

## 2025-08-20 PROCEDURE — S9083 URGENT CARE CENTER GLOBAL: HCPCS | Performed by: EMERGENCY MEDICINE

## 2025-08-20 RX ORDER — NEOMYCIN SULFATE, POLYMYXIN B SULFATE AND HYDROCORTISONE 10; 3.5; 1 MG/ML; MG/ML; [USP'U]/ML
4 SUSPENSION/ DROPS AURICULAR (OTIC) EVERY 6 HOURS SCHEDULED
Qty: 10 ML | Refills: 0 | Status: SHIPPED | OUTPATIENT
Start: 2025-08-20 | End: 2025-08-27